# Patient Record
Sex: MALE | Race: WHITE | NOT HISPANIC OR LATINO | Employment: FULL TIME | URBAN - METROPOLITAN AREA
[De-identification: names, ages, dates, MRNs, and addresses within clinical notes are randomized per-mention and may not be internally consistent; named-entity substitution may affect disease eponyms.]

---

## 2020-07-23 ENCOUNTER — TELEPHONE (OUTPATIENT)
Dept: INTERNAL MEDICINE | Facility: CLINIC | Age: 30
End: 2020-07-23

## 2020-07-23 ENCOUNTER — OFFICE VISIT (OUTPATIENT)
Dept: INTERNAL MEDICINE | Facility: CLINIC | Age: 30
End: 2020-07-23
Payer: COMMERCIAL

## 2020-07-23 VITALS
TEMPERATURE: 98 F | OXYGEN SATURATION: 97 % | DIASTOLIC BLOOD PRESSURE: 77 MMHG | HEIGHT: 71 IN | HEART RATE: 81 BPM | BODY MASS INDEX: 26.55 KG/M2 | SYSTOLIC BLOOD PRESSURE: 125 MMHG | WEIGHT: 189.63 LBS

## 2020-07-23 DIAGNOSIS — R19.7 DIARRHEA: ICD-10-CM

## 2020-07-23 DIAGNOSIS — Z20.822 SUSPECTED COVID-19 VIRUS INFECTION: ICD-10-CM

## 2020-07-23 DIAGNOSIS — M79.10 MUSCLE PAIN: ICD-10-CM

## 2020-07-23 PROCEDURE — 99999 PR PBB SHADOW E&M-EST. PATIENT-LVL III: ICD-10-PCS | Mod: PBBFAC,,, | Performed by: INTERNAL MEDICINE

## 2020-07-23 PROCEDURE — U0003 INFECTIOUS AGENT DETECTION BY NUCLEIC ACID (DNA OR RNA); SEVERE ACUTE RESPIRATORY SYNDROME CORONAVIRUS 2 (SARS-COV-2) (CORONAVIRUS DISEASE [COVID-19]), AMPLIFIED PROBE TECHNIQUE, MAKING USE OF HIGH THROUGHPUT TECHNOLOGIES AS DESCRIBED BY CMS-2020-01-R: HCPCS

## 2020-07-23 PROCEDURE — 99213 OFFICE O/P EST LOW 20 MIN: CPT | Mod: S$GLB,,, | Performed by: INTERNAL MEDICINE

## 2020-07-23 PROCEDURE — 99999 PR PBB SHADOW E&M-EST. PATIENT-LVL III: CPT | Mod: PBBFAC,,, | Performed by: INTERNAL MEDICINE

## 2020-07-23 PROCEDURE — 99213 PR OFFICE/OUTPT VISIT, EST, LEVL III, 20-29 MIN: ICD-10-PCS | Mod: S$GLB,,, | Performed by: INTERNAL MEDICINE

## 2020-07-23 NOTE — TELEPHONE ENCOUNTER
Tried contacting patient and LVM about changing his visit to virtual. Will send pt a [place]etch message.

## 2020-07-23 NOTE — PROGRESS NOTES
Subjective:       Patient ID: Angelito Mills is a 29 y.o. male.    Chief Complaint: Fatigue, Nausea, Nasal Congestion, Headache, Diarrhea, and Abdominal Pain    29 year old man complains of 2 days of body aches, congestion, mild nausea and diarrhea.  No recognized fever..  Girlfriend's co-worker is positive for Covid.  He had rapid covid test 2 days ago that was negative.  At that point he had been symptomatic for less than 24 hours  Today no diarrhea but feels wiped out    Review of Systems   Constitutional: Negative for activity change, appetite change and fever.   HENT: Negative for congestion, postnasal drip and sore throat.    Respiratory: Negative for cough, shortness of breath and wheezing.    Cardiovascular: Negative for chest pain and palpitations.   Gastrointestinal: Negative for abdominal pain, blood in stool, constipation, diarrhea, nausea and vomiting.   Genitourinary: Negative for decreased urine volume, difficulty urinating, flank pain and frequency.   Musculoskeletal: Negative for arthralgias.   Neurological: Negative for dizziness, weakness and headaches.       Objective:      Physical Exam  Constitutional:       General: He is not in acute distress.     Appearance: He is well-developed.   HENT:      Head: Normocephalic and atraumatic.      Right Ear: External ear normal.      Left Ear: External ear normal.   Eyes:      Conjunctiva/sclera: Conjunctivae normal.      Pupils: Pupils are equal, round, and reactive to light.   Neck:      Musculoskeletal: Normal range of motion and neck supple.      Thyroid: No thyromegaly.   Cardiovascular:      Rate and Rhythm: Normal rate and regular rhythm.   Pulmonary:      Effort: Pulmonary effort is normal.      Breath sounds: Normal breath sounds.   Abdominal:      General: Bowel sounds are normal.      Palpations: Abdomen is soft. There is no mass.      Tenderness: There is no abdominal tenderness. There is no guarding or rebound.   Musculoskeletal: Normal range of  motion.   Lymphadenopathy:      Cervical: No cervical adenopathy.   Skin:     General: Skin is warm and dry.   Neurological:      Mental Status: He is alert and oriented to person, place, and time.      Cranial Nerves: No cranial nerve deficit.      Motor: No abnormal muscle tone.      Coordination: Coordination normal.      Deep Tendon Reflexes: Reflexes are normal and symmetric. Reflexes normal.         Assessment:       1. Diarrhea    2. Muscle pain    3. Suspected Covid-19 Virus Infection        Plan:   Angelito was seen today for fatigue, nausea, nasal congestion, headache, diarrhea and abdominal pain.    Diagnoses and all orders for this visit:    Diarrhea  -     COVID-19 Routine Screening    Muscle pain  -     COVID-19 Routine Screening    Suspected Covid-19 Virus Infection  -     COVID-19 Routine Screening

## 2020-07-24 LAB — SARS-COV-2 RNA RESP QL NAA+PROBE: NOT DETECTED

## 2020-11-26 RX ORDER — ONDANSETRON 4 MG/1
4 TABLET, ORALLY DISINTEGRATING ORAL EVERY 8 HOURS PRN
Qty: 30 TABLET | Refills: 1 | Status: CANCELLED | OUTPATIENT
Start: 2020-11-26

## 2021-01-12 ENCOUNTER — LAB VISIT (OUTPATIENT)
Dept: LAB | Facility: HOSPITAL | Age: 31
End: 2021-01-12
Attending: ALLERGY & IMMUNOLOGY
Payer: COMMERCIAL

## 2021-01-12 ENCOUNTER — OFFICE VISIT (OUTPATIENT)
Dept: ALLERGY | Facility: CLINIC | Age: 31
End: 2021-01-12
Payer: COMMERCIAL

## 2021-01-12 VITALS — WEIGHT: 201.94 LBS | TEMPERATURE: 97 F | BODY MASS INDEX: 28.27 KG/M2 | HEIGHT: 71 IN

## 2021-01-12 DIAGNOSIS — H10.423 SIMPLE CHRONIC CONJUNCTIVITIS OF BOTH EYES: ICD-10-CM

## 2021-01-12 DIAGNOSIS — J31.0 CHRONIC RHINITIS: ICD-10-CM

## 2021-01-12 DIAGNOSIS — J31.0 CHRONIC RHINITIS: Primary | ICD-10-CM

## 2021-01-12 PROCEDURE — 86003 ALLG SPEC IGE CRUDE XTRC EA: CPT | Mod: 59

## 2021-01-12 PROCEDURE — 99204 PR OFFICE/OUTPT VISIT, NEW, LEVL IV, 45-59 MIN: ICD-10-PCS | Mod: S$GLB,,, | Performed by: ALLERGY & IMMUNOLOGY

## 2021-01-12 PROCEDURE — 86003 ALLG SPEC IGE CRUDE XTRC EA: CPT

## 2021-01-12 PROCEDURE — 36415 COLL VENOUS BLD VENIPUNCTURE: CPT | Mod: PO

## 2021-01-12 PROCEDURE — 99999 PR PBB SHADOW E&M-EST. PATIENT-LVL III: CPT | Mod: PBBFAC,,, | Performed by: ALLERGY & IMMUNOLOGY

## 2021-01-12 PROCEDURE — 99999 PR PBB SHADOW E&M-EST. PATIENT-LVL III: ICD-10-PCS | Mod: PBBFAC,,, | Performed by: ALLERGY & IMMUNOLOGY

## 2021-01-12 PROCEDURE — 99204 OFFICE O/P NEW MOD 45 MIN: CPT | Mod: S$GLB,,, | Performed by: ALLERGY & IMMUNOLOGY

## 2021-01-12 RX ORDER — MONTELUKAST SODIUM 10 MG/1
10 TABLET ORAL DAILY
Qty: 30 TABLET | Refills: 12 | Status: SHIPPED | OUTPATIENT
Start: 2021-01-12 | End: 2022-02-10

## 2021-01-12 RX ORDER — OLOPATADINE HYDROCHLORIDE 1 MG/ML
1 SOLUTION/ DROPS OPHTHALMIC 2 TIMES DAILY
Qty: 10 ML | Refills: 12 | Status: SHIPPED | OUTPATIENT
Start: 2021-01-12 | End: 2023-08-31

## 2021-01-12 RX ORDER — MONTELUKAST SODIUM 10 MG/1
TABLET ORAL
COMMUNITY
End: 2021-01-12 | Stop reason: SDUPTHER

## 2021-01-12 RX ORDER — PSEUDOEPHEDRINE HCL 120 MG/1
TABLET, FILM COATED, EXTENDED RELEASE ORAL
COMMUNITY
End: 2023-08-31

## 2021-01-12 RX ORDER — DIPHENHYDRAMINE HCL 25 MG
25 CAPSULE ORAL EVERY 6 HOURS PRN
COMMUNITY

## 2021-01-12 RX ORDER — CETIRIZINE HYDROCHLORIDE 10 MG/1
1 TABLET ORAL DAILY
COMMUNITY

## 2021-01-12 RX ORDER — KETOTIFEN FUMARATE 0.35 MG/ML
1 SOLUTION/ DROPS OPHTHALMIC 2 TIMES DAILY
COMMUNITY

## 2021-01-12 RX ORDER — FLUTICASONE PROPIONATE 50 MCG
2 SPRAY, SUSPENSION (ML) NASAL DAILY
COMMUNITY
End: 2021-01-12 | Stop reason: SDUPTHER

## 2021-01-12 RX ORDER — FLUTICASONE PROPIONATE 50 MCG
2 SPRAY, SUSPENSION (ML) NASAL DAILY
Qty: 16 G | Refills: 12 | Status: ON HOLD | OUTPATIENT
Start: 2021-01-12 | End: 2023-06-30 | Stop reason: HOSPADM

## 2021-01-15 LAB
A ALTERNATA IGE QN: <0.1 KU/L
A FUMIGATUS IGE QN: <0.1 KU/L
ALLERGEN CHAETOMIUM GLOBOSUM IGE: <0.1 KU/L
ALLERGEN WALNUT TREE IGE: 0.1 KU/L
ALLERGEN WHITE PINE TREE IGE: <0.1 KU/L
BAHIA GRASS IGE QN: 0.11 KU/L
BALD CYPRESS IGE QN: <0.1 KU/L
BERMUDA GRASS IGE QN: <0.1 KU/L
C HERBARUM IGE QN: <0.1 KU/L
C LUNATA IGE QN: <0.1 KU/L
CAT DANDER IGE QN: 0.73 KU/L
CHAETOMIUM GLOB. CLASS: NORMAL
COMMON RAGWEED IGE QN: <0.1 KU/L
COTTONWOOD IGE QN: <0.1 KU/L
D FARINAE IGE QN: 24.8 KU/L
D PTERONYSS IGE QN: 21.7 KU/L
DEPRECATED A ALTERNATA IGE RAST QL: NORMAL
DEPRECATED A FUMIGATUS IGE RAST QL: NORMAL
DEPRECATED BAHIA GRASS IGE RAST QL: ABNORMAL
DEPRECATED BALD CYPRESS IGE RAST QL: NORMAL
DEPRECATED BERMUDA GRASS IGE RAST QL: NORMAL
DEPRECATED C HERBARUM IGE RAST QL: NORMAL
DEPRECATED C LUNATA IGE RAST QL: NORMAL
DEPRECATED CAT DANDER IGE RAST QL: ABNORMAL
DEPRECATED COMMON RAGWEED IGE RAST QL: NORMAL
DEPRECATED COTTONWOOD IGE RAST QL: NORMAL
DEPRECATED D FARINAE IGE RAST QL: ABNORMAL
DEPRECATED D PTERONYSS IGE RAST QL: ABNORMAL
DEPRECATED DOG DANDER IGE RAST QL: ABNORMAL
DEPRECATED ELDER IGE RAST QL: ABNORMAL
DEPRECATED ENGL PLANTAIN IGE RAST QL: ABNORMAL
DEPRECATED HORSE DANDER IGE RAST QL: NORMAL
DEPRECATED JOHNSON GRASS IGE RAST QL: ABNORMAL
DEPRECATED LONDON PLANE IGE RAST QL: ABNORMAL
DEPRECATED MUGWORT IGE RAST QL: NORMAL
DEPRECATED P NOTATUM IGE RAST QL: NORMAL
DEPRECATED PECAN/HICK TREE IGE RAST QL: NORMAL
DEPRECATED ROACH IGE RAST QL: ABNORMAL
DEPRECATED S ROSTRATA IGE RAST QL: NORMAL
DEPRECATED SALTWORT IGE RAST QL: NORMAL
DEPRECATED SILVER BIRCH IGE RAST QL: ABNORMAL
DEPRECATED TIMOTHY IGE RAST QL: ABNORMAL
DEPRECATED WEST RAGWEED IGE RAST QL: ABNORMAL
DEPRECATED WHITE OAK IGE RAST QL: ABNORMAL
DEPRECATED WILLOW IGE RAST QL: NORMAL
DOG DANDER IGE QN: 1.71 KU/L
ELDER IGE QN: 0.12 KU/L
ENGL PLANTAIN IGE QN: 0.13 KU/L
HORSE DANDER IGE QN: <0.1 KU/L
JOHNSON GRASS IGE QN: 0.25 KU/L
LONDON PLANE IGE QN: 0.11 KU/L
MUGWORT IGE QN: <0.1 KU/L
P NOTATUM IGE QN: <0.1 KU/L
PECAN/HICK TREE IGE QN: <0.1 KU/L
ROACH IGE QN: 0.71 KU/L
S ROSTRATA IGE QN: <0.1 KU/L
SALTWORT IGE QN: <0.1 KU/L
SILVER BIRCH IGE QN: 2.33 KU/L
TIMOTHY IGE QN: 4.47 KU/L
WALNUT TREE CLASS: NORMAL
WEST RAGWEED IGE QN: 0.16 KU/L
WHITE OAK IGE QN: 2.67 KU/L
WHITE PINE CLASS: NORMAL
WILLOW IGE QN: <0.1 KU/L

## 2021-01-19 ENCOUNTER — PATIENT MESSAGE (OUTPATIENT)
Dept: ALLERGY | Facility: CLINIC | Age: 31
End: 2021-01-19

## 2021-02-11 ENCOUNTER — OFFICE VISIT (OUTPATIENT)
Dept: PRIMARY CARE CLINIC | Facility: CLINIC | Age: 31
End: 2021-02-11
Payer: COMMERCIAL

## 2021-02-11 VITALS
TEMPERATURE: 98 F | OXYGEN SATURATION: 97 % | HEART RATE: 74 BPM | DIASTOLIC BLOOD PRESSURE: 78 MMHG | HEIGHT: 71 IN | WEIGHT: 199.94 LBS | BODY MASS INDEX: 27.99 KG/M2 | SYSTOLIC BLOOD PRESSURE: 110 MMHG

## 2021-02-11 DIAGNOSIS — Z76.89 ENCOUNTER TO ESTABLISH CARE: ICD-10-CM

## 2021-02-11 DIAGNOSIS — Z53.20 HIV SCREENING DECLINED: ICD-10-CM

## 2021-02-11 DIAGNOSIS — Z00.00 LABORATORY EXAM ORDERED AS PART OF ROUTINE GENERAL MEDICAL EXAMINATION: ICD-10-CM

## 2021-02-11 DIAGNOSIS — J30.89 ENVIRONMENTAL AND SEASONAL ALLERGIES: ICD-10-CM

## 2021-02-11 DIAGNOSIS — Z00.00 ANNUAL PHYSICAL EXAM: Primary | ICD-10-CM

## 2021-02-11 PROCEDURE — 99999 PR PBB SHADOW E&M-EST. PATIENT-LVL IV: ICD-10-PCS | Mod: PBBFAC,,, | Performed by: FAMILY MEDICINE

## 2021-02-11 PROCEDURE — 99999 PR PBB SHADOW E&M-EST. PATIENT-LVL IV: CPT | Mod: PBBFAC,,, | Performed by: FAMILY MEDICINE

## 2021-02-11 PROCEDURE — 99395 PREV VISIT EST AGE 18-39: CPT | Mod: S$GLB,,, | Performed by: FAMILY MEDICINE

## 2021-02-11 PROCEDURE — 99395 PR PREVENTIVE VISIT,EST,18-39: ICD-10-PCS | Mod: S$GLB,,, | Performed by: FAMILY MEDICINE

## 2021-02-11 RX ORDER — ONDANSETRON 4 MG/1
TABLET, ORALLY DISINTEGRATING ORAL
COMMUNITY
Start: 2020-12-16 | End: 2022-03-29

## 2021-02-18 ENCOUNTER — TELEPHONE (OUTPATIENT)
Dept: PRIMARY CARE CLINIC | Facility: CLINIC | Age: 31
End: 2021-02-18

## 2021-02-18 ENCOUNTER — LAB VISIT (OUTPATIENT)
Dept: LAB | Facility: HOSPITAL | Age: 31
End: 2021-02-18
Attending: FAMILY MEDICINE
Payer: COMMERCIAL

## 2021-02-18 DIAGNOSIS — Z00.00 LABORATORY EXAM ORDERED AS PART OF ROUTINE GENERAL MEDICAL EXAMINATION: ICD-10-CM

## 2021-02-18 LAB
ALBUMIN SERPL BCP-MCNC: 4.3 G/DL (ref 3.5–5.2)
ALP SERPL-CCNC: 57 U/L (ref 55–135)
ALT SERPL W/O P-5'-P-CCNC: 19 U/L (ref 10–44)
ANION GAP SERPL CALC-SCNC: 8 MMOL/L (ref 8–16)
AST SERPL-CCNC: 24 U/L (ref 10–40)
BASOPHILS # BLD AUTO: 0.05 K/UL (ref 0–0.2)
BASOPHILS NFR BLD: 0.7 % (ref 0–1.9)
BILIRUB SERPL-MCNC: 0.7 MG/DL (ref 0.1–1)
BUN SERPL-MCNC: 20 MG/DL (ref 6–20)
CALCIUM SERPL-MCNC: 9 MG/DL (ref 8.7–10.5)
CHLORIDE SERPL-SCNC: 102 MMOL/L (ref 95–110)
CHOLEST SERPL-MCNC: 174 MG/DL (ref 120–199)
CHOLEST/HDLC SERPL: 3.3 {RATIO} (ref 2–5)
CO2 SERPL-SCNC: 28 MMOL/L (ref 23–29)
CREAT SERPL-MCNC: 1.2 MG/DL (ref 0.5–1.4)
DIFFERENTIAL METHOD: ABNORMAL
EOSINOPHIL # BLD AUTO: 0.2 K/UL (ref 0–0.5)
EOSINOPHIL NFR BLD: 2.7 % (ref 0–8)
ERYTHROCYTE [DISTWIDTH] IN BLOOD BY AUTOMATED COUNT: 11.9 % (ref 11.5–14.5)
EST. GFR  (AFRICAN AMERICAN): >60 ML/MIN/1.73 M^2
EST. GFR  (NON AFRICAN AMERICAN): >60 ML/MIN/1.73 M^2
ESTIMATED AVG GLUCOSE: 103 MG/DL (ref 68–131)
GLUCOSE SERPL-MCNC: 95 MG/DL (ref 70–110)
HBA1C MFR BLD: 5.2 % (ref 4–5.6)
HCT VFR BLD AUTO: 46.5 % (ref 40–54)
HCV AB SERPL QL IA: NEGATIVE
HDLC SERPL-MCNC: 52 MG/DL (ref 40–75)
HDLC SERPL: 29.9 % (ref 20–50)
HGB BLD-MCNC: 14.8 G/DL (ref 14–18)
IMM GRANULOCYTES # BLD AUTO: 0.04 K/UL (ref 0–0.04)
IMM GRANULOCYTES NFR BLD AUTO: 0.6 % (ref 0–0.5)
LDLC SERPL CALC-MCNC: 102.2 MG/DL (ref 63–159)
LYMPHOCYTES # BLD AUTO: 2.5 K/UL (ref 1–4.8)
LYMPHOCYTES NFR BLD: 36.5 % (ref 18–48)
MCH RBC QN AUTO: 30.6 PG (ref 27–31)
MCHC RBC AUTO-ENTMCNC: 31.8 G/DL (ref 32–36)
MCV RBC AUTO: 96 FL (ref 82–98)
MONOCYTES # BLD AUTO: 0.6 K/UL (ref 0.3–1)
MONOCYTES NFR BLD: 9.1 % (ref 4–15)
NEUTROPHILS # BLD AUTO: 3.5 K/UL (ref 1.8–7.7)
NEUTROPHILS NFR BLD: 50.4 % (ref 38–73)
NONHDLC SERPL-MCNC: 122 MG/DL
NRBC BLD-RTO: 0 /100 WBC
PLATELET # BLD AUTO: 296 K/UL (ref 150–350)
PMV BLD AUTO: 9.8 FL (ref 9.2–12.9)
POTASSIUM SERPL-SCNC: 4.2 MMOL/L (ref 3.5–5.1)
PROT SERPL-MCNC: 7.4 G/DL (ref 6–8.4)
RBC # BLD AUTO: 4.83 M/UL (ref 4.6–6.2)
SODIUM SERPL-SCNC: 138 MMOL/L (ref 136–145)
TRIGL SERPL-MCNC: 99 MG/DL (ref 30–150)
TSH SERPL DL<=0.005 MIU/L-ACNC: 1.42 UIU/ML (ref 0.4–4)
WBC # BLD AUTO: 6.95 K/UL (ref 3.9–12.7)

## 2021-02-18 PROCEDURE — 85025 COMPLETE CBC W/AUTO DIFF WBC: CPT

## 2021-02-18 PROCEDURE — 84443 ASSAY THYROID STIM HORMONE: CPT

## 2021-02-18 PROCEDURE — 80061 LIPID PANEL: CPT

## 2021-02-18 PROCEDURE — 80053 COMPREHEN METABOLIC PANEL: CPT

## 2021-02-18 PROCEDURE — 36415 COLL VENOUS BLD VENIPUNCTURE: CPT | Mod: PN

## 2021-02-18 PROCEDURE — 86803 HEPATITIS C AB TEST: CPT

## 2021-02-18 PROCEDURE — 83036 HEMOGLOBIN GLYCOSYLATED A1C: CPT

## 2021-04-16 ENCOUNTER — PATIENT MESSAGE (OUTPATIENT)
Dept: RESEARCH | Facility: HOSPITAL | Age: 31
End: 2021-04-16

## 2021-07-14 ENCOUNTER — OFFICE VISIT (OUTPATIENT)
Dept: URGENT CARE | Facility: CLINIC | Age: 31
End: 2021-07-14
Payer: COMMERCIAL

## 2021-07-14 VITALS
WEIGHT: 190 LBS | DIASTOLIC BLOOD PRESSURE: 74 MMHG | RESPIRATION RATE: 16 BRPM | HEART RATE: 72 BPM | BODY MASS INDEX: 26.6 KG/M2 | TEMPERATURE: 97 F | OXYGEN SATURATION: 100 % | SYSTOLIC BLOOD PRESSURE: 120 MMHG | HEIGHT: 71 IN

## 2021-07-14 DIAGNOSIS — J03.90 EXUDATIVE TONSILLITIS: ICD-10-CM

## 2021-07-14 DIAGNOSIS — J02.9 SORE THROAT: Primary | ICD-10-CM

## 2021-07-14 LAB
CTP QC/QA: YES
CTP QC/QA: YES
MOLECULAR STREP A: NEGATIVE
SARS-COV-2 RDRP RESP QL NAA+PROBE: NEGATIVE

## 2021-07-14 PROCEDURE — 87651 STREP A DNA AMP PROBE: CPT | Mod: QW,S$GLB,, | Performed by: INTERNAL MEDICINE

## 2021-07-14 PROCEDURE — 99214 PR OFFICE/OUTPT VISIT, EST, LEVL IV, 30-39 MIN: ICD-10-PCS | Mod: 25,S$GLB,, | Performed by: INTERNAL MEDICINE

## 2021-07-14 PROCEDURE — 96372 PR INJECTION,THERAP/PROPH/DIAG2ST, IM OR SUBCUT: ICD-10-PCS | Mod: S$GLB,,, | Performed by: INTERNAL MEDICINE

## 2021-07-14 PROCEDURE — U0002 COVID-19 LAB TEST NON-CDC: HCPCS | Mod: QW,S$GLB,, | Performed by: INTERNAL MEDICINE

## 2021-07-14 PROCEDURE — U0002: ICD-10-PCS | Mod: QW,S$GLB,, | Performed by: INTERNAL MEDICINE

## 2021-07-14 PROCEDURE — 96372 THER/PROPH/DIAG INJ SC/IM: CPT | Mod: S$GLB,,, | Performed by: INTERNAL MEDICINE

## 2021-07-14 PROCEDURE — 99214 OFFICE O/P EST MOD 30 MIN: CPT | Mod: 25,S$GLB,, | Performed by: INTERNAL MEDICINE

## 2021-07-14 PROCEDURE — 87651 POCT STREP A MOLECULAR: ICD-10-PCS | Mod: QW,S$GLB,, | Performed by: INTERNAL MEDICINE

## 2021-07-14 RX ORDER — DEXAMETHASONE SODIUM PHOSPHATE 100 MG/10ML
10 INJECTION INTRAMUSCULAR; INTRAVENOUS
Status: COMPLETED | OUTPATIENT
Start: 2021-07-14 | End: 2021-07-14

## 2021-07-14 RX ORDER — AMOXICILLIN AND CLAVULANATE POTASSIUM 875; 125 MG/1; MG/1
1 TABLET, FILM COATED ORAL EVERY 12 HOURS
Qty: 20 TABLET | Refills: 0 | Status: SHIPPED | OUTPATIENT
Start: 2021-07-14 | End: 2021-07-24

## 2021-07-14 RX ADMIN — DEXAMETHASONE SODIUM PHOSPHATE 10 MG: 100 INJECTION INTRAMUSCULAR; INTRAVENOUS at 08:07

## 2022-01-03 ENCOUNTER — TELEPHONE (OUTPATIENT)
Dept: ORTHOPEDICS | Facility: CLINIC | Age: 32
End: 2022-01-03
Payer: COMMERCIAL

## 2022-01-03 NOTE — TELEPHONE ENCOUNTER
LVM for pt informing him that sooner appts are available for his possible tendon injury. Offered appts 01/04/22 or 01/06/22. Requested a call back to [facility] to assist with r/s, if interested/available.

## 2022-01-04 ENCOUNTER — TELEPHONE (OUTPATIENT)
Dept: ORTHOPEDICS | Facility: CLINIC | Age: 32
End: 2022-01-04
Payer: COMMERCIAL

## 2022-01-04 ENCOUNTER — OFFICE VISIT (OUTPATIENT)
Dept: ORTHOPEDICS | Facility: CLINIC | Age: 32
End: 2022-01-04
Payer: COMMERCIAL

## 2022-01-04 ENCOUNTER — HOSPITAL ENCOUNTER (OUTPATIENT)
Dept: RADIOLOGY | Facility: OTHER | Age: 32
Discharge: HOME | End: 2022-01-04
Attending: ORTHOPAEDIC SURGERY
Payer: COMMERCIAL

## 2022-01-04 VITALS
HEART RATE: 76 BPM | DIASTOLIC BLOOD PRESSURE: 82 MMHG | WEIGHT: 190 LBS | HEIGHT: 71 IN | SYSTOLIC BLOOD PRESSURE: 124 MMHG | BODY MASS INDEX: 26.6 KG/M2

## 2022-01-04 DIAGNOSIS — M20.032 SWAN-NECK DEFORMITY OF FINGER OF LEFT HAND: ICD-10-CM

## 2022-01-04 DIAGNOSIS — M79.642 LEFT HAND PAIN: Primary | ICD-10-CM

## 2022-01-04 DIAGNOSIS — M79.642 LEFT HAND PAIN: ICD-10-CM

## 2022-01-04 DIAGNOSIS — S69.92XA INJURY OF FINGER OF LEFT HAND, INITIAL ENCOUNTER: Primary | ICD-10-CM

## 2022-01-04 PROCEDURE — 73130 XR HAND COMPLETE 3 VIEW LEFT: ICD-10-PCS | Mod: 26,LT,, | Performed by: RADIOLOGY

## 2022-01-04 PROCEDURE — 99204 PR OFFICE/OUTPT VISIT, NEW, LEVL IV, 45-59 MIN: ICD-10-PCS | Mod: S$GLB,,, | Performed by: ORTHOPAEDIC SURGERY

## 2022-01-04 PROCEDURE — 73130 X-RAY EXAM OF HAND: CPT | Mod: TC,FY,LT

## 2022-01-04 PROCEDURE — 73130 X-RAY EXAM OF HAND: CPT | Mod: 26,LT,, | Performed by: RADIOLOGY

## 2022-01-04 PROCEDURE — 99999 PR PBB SHADOW E&M-EST. PATIENT-LVL III: CPT | Mod: PBBFAC,,, | Performed by: ORTHOPAEDIC SURGERY

## 2022-01-04 PROCEDURE — 99204 OFFICE O/P NEW MOD 45 MIN: CPT | Mod: S$GLB,,, | Performed by: ORTHOPAEDIC SURGERY

## 2022-01-04 PROCEDURE — 99999 PR PBB SHADOW E&M-EST. PATIENT-LVL III: ICD-10-PCS | Mod: PBBFAC,,, | Performed by: ORTHOPAEDIC SURGERY

## 2022-01-04 NOTE — TELEPHONE ENCOUNTER
Spoke c pt. Confirmed appt location & time change c Dr. Ugarte 01/04/22. Informed pt that XRs are needed prior to appt. Advised pt to arrive for XR appt 45  minutes prior to scheduled appt c Dr. Ugarte. Advised pt that XR is located on 1st floor of [facility] and the Hand Clinic is located on the 9th floor, [address].  Patient expressed understanding and was thankful.     ----- Message from Karly Pillai sent at 1/4/2022 10:50 AM CST -----  Regarding: missed call       Who Called: Angelito         Who Left Message for Patient: Justine          Does the patient know what this is regarding? Yes         Best Call Back Number:[phone]         Additional Information:

## 2022-01-04 NOTE — PROGRESS NOTES
Subjective:      Patient ID: Angelito Mills is a 31 y.o. male.    Chief Complaint: Pain of the Left Hand      HPI  Angelito Mills is a 31 y.o. male presenting today for evaluation of the left long finger. He reports an injury to the finger about one month ago he states he was reaching for a remote in the couch when he jammed his finger on the hard wooden part under the couch. He felt an immediate pop with associated pain. He reports persistent pain in the finger with decreased range of motion. He has been keeping the finger immobilized in a finger splint. He does remove periodically for gentle ROM but reports this causes pain. He denies numbness.       Review of patient's allergies indicates:  No Known Allergies      Current Outpatient Medications   Medication Sig Dispense Refill    cetirizine (ZYRTEC) 10 MG tablet Zyrtec 10 mg tablet   Take 1 tablet every day by oral route.      diphenhydrAMINE (BENADRYL) 25 mg capsule Take 25 mg by mouth every 6 (six) hours as needed for Itching.      fluticasone propionate (FLONASE) 50 mcg/actuation nasal spray 2 sprays (100 mcg total) by Each Nostril route once daily. 16 g 12    ketotifen (ZADITOR) 0.025 % (0.035 %) ophthalmic solution 1 drop 2 (two) times daily.      montelukast (SINGULAIR) 10 mg tablet Take 1 tablet (10 mg total) by mouth once daily. 30 tablet 12    MULTIVITAMIN ORAL multivitamin      olopatadine (PATANOL) 0.1 % ophthalmic solution Place 1 drop into both eyes 2 (two) times daily. 10 mL 12    pseudoephedrine (SUDAFED) 120 mg 12 hr tablet Sudafed 12 Hour      ondansetron (ZOFRAN-ODT) 4 MG TbDL DISSOLVE 1 TO 2 TABLETS IN MOUTH EVERY 8 HOURS AS NEEDED FOR NAUSEA       No current facility-administered medications for this visit.       Past Medical History:   Diagnosis Date    ADD (attention deficit disorder)     Allergy     Recurrent upper respiratory infection (URI)        History reviewed. No pertinent surgical history.    Review of  "Systems:  Constitutional: Negative for chills and fever.   Respiratory: Negative for cough and shortness of breath.    Gastrointestinal: Negative for nausea and vomiting.   Skin: Negative for rash.   Neurological: Negative for dizziness and headaches.   Psychiatric/Behavioral: Negative for depression.   MSK as in HPI       OBJECTIVE:     PHYSICAL EXAM:  /82   Pulse 76   Ht 5' 11" (1.803 m)   Wt 86.2 kg (190 lb)   BMI 26.50 kg/m²     GEN:  NAD, well-developed, well-groomed.  NEURO: Awake, alert, and oriented. Normal attention and concentration.    PSYCH: Normal mood and affect. Behavior is normal.  HEENT: No cervical lymphadenopathy noted.  CARDIOVASCULAR: Radial pulses 2+ bilaterally. No LE edema noted.  PULMONARY: Breath sounds normal. No respiratory distress.  SKIN: Intact, no rashes.      MSK:   LUE:  Good active ROM of the wrist and fingers. He has edema over the posterior DIP of the finger with minimal tenderness to palpation of the area. he has minimal extensor lag present. There is mild hyperextension at the PIP joint. He is able to flex the finger with some difficulty and associated pain. AIN/PIN/Radial/Median/Ulnar Nerves assessed in isolation without deficit. Radial & Ulnar arteries palpated 2+. Capillary Refill <3s.      RADIOGRAPHS:  Xray left hand 1/4/22   FINDINGS:  No acute fracture or dislocation seen.  No significant soft tissue edema or radiopaque retained foreign body.   Impression:   No acute osseous abnormality seen.    Comments: I have personally reviewed the imaging and I agree with the above radiologist's report.    ASSESSMENT/PLAN:       ICD-10-CM ICD-9-CM   1. Injury of finger of left hand, initial encounter  S69.92XA 959.5   2. [place]-neck deformity of finger of left hand  M20.032 736.22       Orders Placed This Encounter    Ambulatory referral/consult to Physical/Occupational Therapy     Plan:   OT ordered, can fabricate custom orthosis with DIP extension and PIP flexion   RTC 6 " weeks with PA         The patient indicates understanding of these issues and agrees to the plan.    Tootie Denton PA-C  [facility] [place]

## 2022-01-05 NOTE — PROGRESS NOTES
I have personally taken the history and examined the patient. I agree with the Hand Surgery PA's note. The plan will be :           Orders Placed This Encounter    Ambulatory referral/consult to Physical/Occupational Therapy      Plan:   OT ordered, can fabricate custom orthosis with DIP extension and PIP flexion   RTC 6 weeks with PA

## 2022-01-07 ENCOUNTER — CLINICAL SUPPORT (OUTPATIENT)
Dept: REHABILITATION | Facility: HOSPITAL | Age: 32
End: 2022-01-07
Payer: COMMERCIAL

## 2022-01-07 DIAGNOSIS — S69.92XA INJURY OF FINGER OF LEFT HAND, INITIAL ENCOUNTER: ICD-10-CM

## 2022-01-07 DIAGNOSIS — M25.642 FINGER STIFFNESS, LEFT: ICD-10-CM

## 2022-01-07 DIAGNOSIS — M20.032 SWAN-NECK DEFORMITY OF FINGER OF LEFT HAND: ICD-10-CM

## 2022-01-07 PROCEDURE — L3933 FO W/O JOINTS CF: HCPCS

## 2022-01-14 PROBLEM — M25.642 FINGER STIFFNESS, LEFT: Status: ACTIVE | Noted: 2022-01-14

## 2022-01-14 NOTE — PROGRESS NOTES
OT Orthosis Only    TIME RECORD    Date:  1/7/2022    Start Time:  2:00pm  Stop Time:  3:00pm    PROCEDURES:      UNTIMED  Procedure Min.    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    Injury of finger of left hand, initial encounter     [place]-neck deformity of finger of left hand     Finger stiffness, left        Subjective:     Pt. Reported comfort after completion of orthosis  Objective:     Patient seen by OT this session for fabrication of orthosis per MD orders. Fabricated and applied Anti-[place]-neck deformity Orthosis  Assessment:     Orthosis with good fit following fabrication. Pt. Able to carmen/doff independently.      Patient Education/Response:   Pt. Instructed to wear continuous, remove for bathing or hygiene. Patient/caregiver were provided written instructions on orthosis purpose, wear schedule, care and precautions to monitor for increased pain/edema, pressure points, skin breakdown or redness/skin irritation Patient/caregiver to contact clinic for adjustments as needed.  Patient signed copy of orthosis instructions, acknowledging delivery and understanding of wear, care, and precautions     (see Media for scanned documents)    Plans and Goals:     Goal of Orthosis to protect sx site/protect injury site. Pt. Instructed to return in 1 week for splint check.

## 2022-01-19 ENCOUNTER — CLINICAL SUPPORT (OUTPATIENT)
Dept: REHABILITATION | Facility: HOSPITAL | Age: 32
End: 2022-01-19
Payer: COMMERCIAL

## 2022-01-19 DIAGNOSIS — M25.642 FINGER STIFFNESS, LEFT: ICD-10-CM

## 2022-01-19 PROCEDURE — 97763 ORTHC/PROSTC MGMT SBSQ ENC: CPT

## 2022-01-20 NOTE — PROGRESS NOTES
Pt. Presented to clinic with LF in good position.  [place] posture forming.  Due to improvements in lateral band alignment, the decision was made to transition pt. to a Mallet Finger Splint.  Therapist fabricated a new Mallet Finger Orthosis.   Pt. Able to carmen and doff orthosis accurately.  Pt. Instructed to perform PIP e/f, avoiding PIP hyperextension. Pt. Educated in wearing an anti-[place] neck orthosis intermittently at night and for additional protection only.

## 2022-02-10 ENCOUNTER — PATIENT MESSAGE (OUTPATIENT)
Dept: ALLERGY | Facility: CLINIC | Age: 32
End: 2022-02-10
Payer: COMMERCIAL

## 2022-02-15 ENCOUNTER — OFFICE VISIT (OUTPATIENT)
Dept: ORTHOPEDICS | Facility: CLINIC | Age: 32
End: 2022-02-15
Payer: COMMERCIAL

## 2022-02-15 VITALS — WEIGHT: 190 LBS | BODY MASS INDEX: 26.6 KG/M2 | HEIGHT: 71 IN

## 2022-02-15 DIAGNOSIS — M20.032 SWAN-NECK DEFORMITY OF FINGER OF LEFT HAND: Primary | ICD-10-CM

## 2022-02-15 PROCEDURE — 99213 OFFICE O/P EST LOW 20 MIN: CPT | Mod: S$GLB,,, | Performed by: PHYSICIAN ASSISTANT

## 2022-02-15 PROCEDURE — 99999 PR PBB SHADOW E&M-EST. PATIENT-LVL III: CPT | Mod: PBBFAC,,, | Performed by: PHYSICIAN ASSISTANT

## 2022-02-15 PROCEDURE — 99999 PR PBB SHADOW E&M-EST. PATIENT-LVL III: ICD-10-PCS | Mod: PBBFAC,,, | Performed by: PHYSICIAN ASSISTANT

## 2022-02-15 PROCEDURE — 99213 PR OFFICE/OUTPT VISIT, EST, LEVL III, 20-29 MIN: ICD-10-PCS | Mod: S$GLB,,, | Performed by: PHYSICIAN ASSISTANT

## 2022-02-15 NOTE — PATIENT INSTRUCTIONS
Transition to mallet finger splint alone for the next week, then transition to shantell tape and nighttime splinting x2 weeks    Restart OT

## 2022-02-15 NOTE — PROGRESS NOTES
Subjective:      Patient ID: Angelito Mills is a 31 y.o. male.    Chief Complaint: Pain of the Left Hand      HPI  Angelito Mills is a  31 y.o. male presenting today for follow up of left long finger swan-neck deformity. Reports jamming his left long finger in the couch 2-3 months ago.  He was seen by Dr. Thurston, then evaluated by OT and placed in custom finger splint. He reports that he is back in the initial swan neck correcting splint due to the mallet finger splint extending too far and limiting his PIP motion. He reports his pain is improved.      Review of patient's allergies indicates:  No Known Allergies      Current Outpatient Medications   Medication Sig Dispense Refill    cetirizine (ZYRTEC) 10 MG tablet Zyrtec 10 mg tablet   Take 1 tablet every day by oral route.      diphenhydrAMINE (BENADRYL) 25 mg capsule Take 25 mg by mouth every 6 (six) hours as needed for Itching.      fluticasone propionate (FLONASE) 50 mcg/actuation nasal spray 2 sprays (100 mcg total) by Each Nostril route once daily. 16 g 12    ketotifen (ZADITOR) 0.025 % (0.035 %) ophthalmic solution 1 drop 2 (two) times daily.      montelukast (SINGULAIR) 10 mg tablet TAKE 1 TABLET(10 MG) BY MOUTH EVERY DAY 30 tablet 0    MULTIVITAMIN ORAL multivitamin      pseudoephedrine (SUDAFED) 120 mg 12 hr tablet Sudafed 12 Hour      olopatadine (PATANOL) 0.1 % ophthalmic solution Place 1 drop into both eyes 2 (two) times daily. 10 mL 12    ondansetron (ZOFRAN-ODT) 4 MG TbDL DISSOLVE 1 TO 2 TABLETS IN MOUTH EVERY 8 HOURS AS NEEDED FOR NAUSEA       No current facility-administered medications for this visit.       Past Medical History:   Diagnosis Date    ADD (attention deficit disorder)     Allergy     Recurrent upper respiratory infection (URI)        History reviewed. No pertinent surgical history.      Review of Systems:  ROS:  Constitutional: no fever or chills  Skin: no rash or suspicious lesions  Musculoskeletal: See HPI.  "  Neurological: no headaches, lightheadedness, or dizziness. No numbness or tingling  Psychological/behavioral: no anxiety or depression      OBJECTIVE:     PHYSICAL EXAM:  Height: 5' 11" (180.3 cm) Weight: 86.2 kg (190 lb)  Vitals:    02/15/22 1506   Weight: 86.2 kg (190 lb)   Height: 5' 11" (1.803 m)   PainSc:   4     Vitals reviewed.  Constitutional: NAD. Patient appears well-developed and well-nourished.   HENT:   Head: Normocephalic and atraumatic.   Neck: Normal range of motion.   Cardiovascular: Normal rate.    Pulmonary/Chest: Effort normal. No respiratory distress.   Neurological: Patient is awake, alert, oriented.   Psychiatric: Patient has a normal mood and affect. Behavior is normal. Judgment and thought content normal.  Musculoskeletal:  Custom orthotic in place.  Mild edema long finger.  No scars.  Left long PIP able to extend to neutral, no hyperextension noted.  Fair flexion of the PIP joint.  Left long de IP kept in neutral with support, wound splint was removed the PIP is able to stay in neutral extension.  [place] finger splint placed to support the PIP in extension.  Neurovascularly intact-good sensation motor function, good capillary refill, 2+ radial pulses.    RADIOGRAPHS:  Left Hand XRay, 1/4/2022  FINDINGS:  No acute fracture or dislocation seen.  No significant soft tissue edema or radiopaque retained foreign body.     Impression:  No acute osseous abnormality seen.    Comments: I have personally reviewed the imaging and I agree with the above radiologist's report.    ASSESSMENT/PLAN:   Angelito was seen today for pain.    Diagnoses and all orders for this visit:    [place]-neck deformity of finger of left hand  -     Ambulatory referral/consult to Physical/Occupational Therapy; Future        - discussed patient's resolving swan-neck deformity, discussed the mallet finger of the D IP joint that is improving/resolving.  Discussed restarting OT  - new OT order placed  - transition to mallet finger " splint alone for the next week, then transition to shantell tape and nighttime splinting x2 weeks  - call with questions or concerns  - follow-up in 6 weeks if not improved    Disclaimer: This note has been generated using voice-recognition software. There may be typographical errors that have been missed during proof-reading.

## 2022-02-24 ENCOUNTER — CLINICAL SUPPORT (OUTPATIENT)
Dept: REHABILITATION | Facility: HOSPITAL | Age: 32
End: 2022-02-24
Payer: COMMERCIAL

## 2022-02-24 DIAGNOSIS — M20.032 SWAN-NECK DEFORMITY OF FINGER OF LEFT HAND: ICD-10-CM

## 2022-02-24 DIAGNOSIS — M25.60 STIFFNESS IN JOINT: ICD-10-CM

## 2022-02-24 DIAGNOSIS — R53.1 WEAKNESS: ICD-10-CM

## 2022-02-24 PROCEDURE — 97110 THERAPEUTIC EXERCISES: CPT | Mod: PO

## 2022-02-24 PROCEDURE — 97165 OT EVAL LOW COMPLEX 30 MIN: CPT | Mod: PO

## 2022-02-24 NOTE — PATIENT INSTRUCTIONS
"[facility], OCCUPATIONAL THERAPY  HOME EXERCISE PROGRAM     Complete the following two massages for 3 minutes, 3x/day:                                  Complete the following exercises for 10 repetitions, 4x/day:       AROM: Isolated IPJ Flexion / Extension ("Hook")   Bend only your middle and end knuckles. Hold 3 seconds.   Straighten your fingers.       AROM: MCP and PIP Flexion / Extension ("Straight Fist")  Bend your bottom and middle knuckles, keeping the tips of your fingers straight.   Try to touch the pads of your fingers on your palm. Hold 3 seconds.   Straighten your fingers.       AROM: Composite Flexion / Extension ("Full Fist")  Bend every joint in your hand into a fist. Hold 3 seconds.   Straighten your fingers.     AROM: PIP (Middle Joint) Flexion / Extension  Pinch bottom knuckle  to prevent bending.   Actively bend middle knuckle until stretch is felt.   Hold 3 seconds. Relax. Straighten finger as far as possible.    AROM: DIP (Tip Joint) Flexion / Extension  Pinch middle knuckle to prevent bending.   Bend end knuckle until stretch is felt. Hold   3 seconds. Relax. Straighten finger as far as possible.      AROM: Abduction / Adduction  With hand flat on table, spread all fingers apart,   then bring them together as close as possible.  possible then straighten.     Therapist: Maggie Boasberg, OTR/ALFREDT        "

## 2022-02-24 NOTE — PLAN OF CARE
[facility]  Occupational Therapy Initial Evaluation    Date: 2/24/2022  Name: Angelito Mills  Clinic Number: [id number]    Therapy Diagnosis:   Encounter Diagnoses   Name Primary?   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     Stiffness in joint     Weakness      Physician: Adele Alarcon PA    Physician Orders: Start ROM and strengthening  Eval and Treat, modalities as needed  8+ visits  Medical Diagnosis: M20.032 (ICD-10-CM) - Pass Christian-neck deformity of finger of left hand  Surgical Procedure and Date: n/a / Date of Injury/Onset: 12/2/21  Evaluation Date: 2/24/22  Insurance Authorization Period Expiration: 12/31/22  Plan of Care Expiration: 4/22/22  Progress Note Due: 4/22/22   Date of Return to MD: not scheduled  Visit # / Visits authorized: 1 / 1  FOTO: initial eval     Precautions:  Standard    Time In: 1:30 pm  Time Out: 2:15 pm  Total Appointment Time (timed & untimed codes): 45 minutes      SUBJECTIVE     Date of Onset: 12/2/21    History of Current Condition/Mechanism of Injury: Angelito reports: he hit L MF on sofa and had a mallet finger deformity that turned into swan neck. He was splinted and returns today for evaluation.    Falls: none    Involved Side: L  Dominant Side: Right  Imaging: none   Prior Therapy: none  Occupation:    Working presently: employed  Duties: desk work and field work    Functional Limitations/Social History:    Previous functional status includes: Independent with all ADLs.     Current Functional Status   Home/Living environment: lives with their spouse      Limitation of Functional Status as follows:   ADLs/IADLs:     - Feeding: ind    - Bathing: ind    - Dressing/Grooming: ind    - Driving: ind     Leisure: Fishing, Hunting and working out    Pain:  Functional Pain Scale Rating 0-10: Current 0/10, worst 5/10, best 0/10   Location: L MF PIP joint and P2  Description: Aching and Dull  Aggravating Factors: Bending and  Lifting  Easing Factors: rest    Patient's Goals for Therapy: to use hand normally    Medical History:   Past Medical History:   Diagnosis Date    ADD (attention deficit disorder)     Allergy     Recurrent upper respiratory infection (URI)        Surgical History:    has no past surgical history on file.    Medications:   has a current medication list which includes the following prescription(s): cetirizine, diphenhydramine, fluticasone propionate, ketotifen, montelukast, multivitamin, olopatadine, ondansetron, and pseudoephedrine.    Allergies:   Review of patient's allergies indicates:  No Known Allergies       OBJECTIVE     Observation/Appearance: mild edema noted; pt presented wearing mallet splint    Edema. Measured in centimeters.   2/24/2022 2/24/2022    Left Right   Long:       P1 6.9 6.5    PIP 6.9 6.7   P2 5.9 5.9    DIP 5.5 5.5   P3 5.2 5.3       Hand ROM. Measured in degrees.   2/24/2022 2/24/2022    Left Right        Long:  MP 80 80              PIP 90 105              DIP 30 70              ESPINAL             Strength (Dynamometer) and Pinch Strength (Pinch Gauge)  Measured in pounds.   2/24/2022 2/24/2022    Left Right   Rung II 85 125   Key Pinch 20 22   3pt Pinch 19 24     Sensation: no numbness or tingling reported    Treatment   Total Treatment time (time-based codes) separate from Evaluation: 30 minutes    Angelito received the treatments listed below:     Supervised modalities after being cleared for contradictions: Paraffin bath -     Manual therapy techniques: Soft tissue Mobilization were applied to the: L MF for 10 minutes, including:  IASTM    Therapeutic exercises to develop ROM for 10 minutes, including:  TGEs and blocking exercises    Patient Education and Home Exercises      Education provided:   - precautions and progression of treatment    Written Home Exercises Provided: Patient instructed to cont prior HEP.  Exercises were reviewed and Angelito was able to demonstrate them prior to the  end of the session.  Angelito demonstrated good  understanding of the education provided. See EMR under Patient Instructions for exercises provided during therapy sessions.     Pt was advised to perform these exercises free of pain, and to stop performing them if pain occurs.    Patient/Family Education: role of OT, goals for OT, scheduling/cancellations - pt verbalized understanding. Discussed insurance limitations with patient.    ASSESSMENT     Angelito Mills is a 31 y.o. male referred to outpatient occupational therapy and presents with a medical diagnosis of resolving L MF swan neck deformity.  Patient presents with the following therapy deficits: Decreased ROM, Decreased  strength, Decreased pinch strength, Decreased functional hand use, Increased pain, Edema and Joint Stiffness and demonstrates limitations as described in the chart below. Following medical record review it is determined that pt will benefit from occupational therapy services in order to maximize pain free and/or functional use of left hand. The following goals were discussed with the patient and patient is in agreement with them as to be addressed in the treatment plan. The patient's rehab potential is Excellent.     Anticipated barriers to occupational therapy: none  Pt has no cultural, educational or language barriers to learning provided.    Profile and History Assessment of Occupational Performance Level of Clinical Decision Making Complexity Score   Occupational Profile:   Angelito Mills is a 31 y.o. male who lives with their spouse and is currently employed Angelito Mills has difficulty with  ADLs and IADLs as listed previously, which  Affecting hisdaily functional abilities.      Comorbidities:    has a past medical history of ADD (attention deficit disorder), Allergy, and Recurrent upper respiratory infection (URI).    Medical and Therapy History Review:   Brief               Performance Deficits    Physical:  Joint  Mobility  Muscle Power/Strength  Edema   Strength  Pain    Cognitive:  No Deficits    Psychosocial:    No Deficits     Clinical Decision Making:  low    Assessment Process:  Problem-Focused Assessments    Modification/Need for Assistance:  Not Necessary    Intervention Selection:  Multiple Treatment Options       low  Based on PMHX, co morbidities , data from assessments and functional level of assistance required with task and clinical presentation directly impacting function.         Goals:   The following goals were discussed with the patient and patient is in agreement with them as to be addressed in the treatment plan.   Long Term Goals (LTGs); to be met by discharge.  LTG #1: Pt will report no pain with gripping and lifting tasks.  LTG #2: Pt will return to prior level of function for ADLs and household management.     Short Term Goals (STGs); to be met within 4 weeks.  STG #1: Pt will report 2 out of 10 pain level with using L hand in ADLs.  STG #2: Pt will demonstrate independence with issued HEP.   STG #3: Pt will demo improved  L MF ESPINAL to WNLs for use gripping objects.    PLAN   Plan of Care Certification: 2/24/2022 to 4/22/22.     Outpatient Occupational Therapy 2 times weekly for 8 weeks to include the following interventions: Paraffin, Fluidotherapy, Manual therapy/joint mobilizations, Therapeutic exercises/activities., Strengthening, Orthotic Fabrication/Fit/Training and Edema Control.      Margaret Boasberg, OT      I CERTIFY THE NEED FOR THESE SERVICES FURNISHED UNDER THIS PLAN OF TREATMENT AND WHILE UNDER MY CARE  Physician's comments:      Physician's Signature: ___________________________________________________

## 2022-03-10 ENCOUNTER — CLINICAL SUPPORT (OUTPATIENT)
Dept: REHABILITATION | Facility: HOSPITAL | Age: 32
End: 2022-03-10
Payer: COMMERCIAL

## 2022-03-10 DIAGNOSIS — M25.60 STIFFNESS IN JOINT: Primary | ICD-10-CM

## 2022-03-10 DIAGNOSIS — R53.1 WEAKNESS: ICD-10-CM

## 2022-03-10 PROCEDURE — 97018 PARAFFIN BATH THERAPY: CPT | Mod: PO

## 2022-03-10 PROCEDURE — 97140 MANUAL THERAPY 1/> REGIONS: CPT | Mod: PO

## 2022-03-10 PROCEDURE — 97110 THERAPEUTIC EXERCISES: CPT | Mod: PO

## 2022-03-10 NOTE — PROGRESS NOTES
Occupational Therapy Treatment Note     Date: 3/10/2022  Name: Angelito Mills  Clinic Number: [id number]    Therapy Diagnosis:   Encounter Diagnoses   Name Primary?   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   Response to previous treatment: increased ROM    Pain: 0/10  Location: left finger    Objective     Angelito received the following supervised modalities after being cleared for contradictions for 10 minutes:   -Paraffin to decrease pain and stiffness and increase tissue elasticity    Angelito received the following manual therapy techniques for 10 minutes:   -IASTM to decrease stiffness and adhesions in surrounding tissues    Angelito received therapeutic exercises for 20 minutes including:  -TGEs and blocking exercises x 10 reps each  -green theraputty dowel pressing, gripping, roll and pinch and extension blooms x 5 min  -green therabar smiles, frowns and twists 2 x 10 reps  -pom pom  with black clothespin x 1 container    Observation/Appearance: mild edema noted; pt presented wearing mallet splint     Edema. Measured in centimeters.    2/24/2022 2/24/2022     Left Right   Long:         P1 6.9 6.5    PIP 6.9 6.7   P2 5.9 5.9    DIP 5.5 5.5   P3 5.2 5.3         Hand ROM. Measured in  degrees.    2/24/2022 2/24/2022     Left Right           Long:  MP 80 80              PIP 90 105              DIP 30 70              ESPINAL                   Strength (Dynamometer) and Pinch Strength (Pinch Gauge)  Measured in pounds.    2/24/2022 2/24/2022     Left Right   Rung II 85 125   Key Pinch 20 22   3pt Pinch 19 24     Home Exercises and Education Provided     Education provided:   - Continue HEP  - Progress towards goals     Written Home Exercises Provided: Patient instructed to cont prior HEP.  Exercises were reviewed and Angelito was able to demonstrate them prior to the end of the session.  Angelito demonstrated good  understanding of the HEP provided.   .   See EMR under Patient Instructions for exercises provided prior visit.        Assessment     Pt performed well in session. No pain reported, and pt reports no difficulty with functional use of L hand. All goals have been met. Plan to D/C to HEP.     Anticipated barriers to occupational therapy: none    Pt's spiritual, cultural and educational needs considered and pt agreeable to plan of care and goals.    Goals:  The following goals were discussed with the patient and patient is in agreement with them as to be addressed in the treatment plan.   Long Term Goals (LTGs); to be met by discharge.  LTG #1: Pt will report no pain with gripping and lifting tasks.  MET  LTG #2: Pt will return to prior level of function for ADLs and household management. MET     Short Term Goals (STGs); to be met within 4 weeks.  STG #1: Pt will report 2 out of 10 pain level with using L hand in ADLs.  MET  STG #2: Pt will demonstrate independence with issued HEP. MET  STG #3: Pt will demo improved  L MF ESPINAL to WNLs for use gripping objects.  MET    Plan   D/C to HEP      Margaret Boasberg, OT

## 2022-03-29 ENCOUNTER — OFFICE VISIT (OUTPATIENT)
Dept: PRIMARY CARE CLINIC | Facility: CLINIC | Age: 32
End: 2022-03-29
Payer: COMMERCIAL

## 2022-03-29 VITALS
SYSTOLIC BLOOD PRESSURE: 112 MMHG | DIASTOLIC BLOOD PRESSURE: 62 MMHG | HEART RATE: 75 BPM | TEMPERATURE: 98 F | OXYGEN SATURATION: 97 % | BODY MASS INDEX: 28.15 KG/M2 | WEIGHT: 201.06 LBS | HEIGHT: 71 IN

## 2022-03-29 DIAGNOSIS — Z00.00 LABORATORY EXAM ORDERED AS PART OF ROUTINE GENERAL MEDICAL EXAMINATION: ICD-10-CM

## 2022-03-29 DIAGNOSIS — Z67.90 UNSPECIFIED BLOOD TYPE, RH POSITIVE: ICD-10-CM

## 2022-03-29 DIAGNOSIS — Z00.00 ANNUAL PHYSICAL EXAM: Primary | ICD-10-CM

## 2022-03-29 DIAGNOSIS — J30.89 ENVIRONMENTAL AND SEASONAL ALLERGIES: ICD-10-CM

## 2022-03-29 DIAGNOSIS — Z87.11 HISTORY OF GASTRIC ULCER: ICD-10-CM

## 2022-03-29 PROCEDURE — 99395 PREV VISIT EST AGE 18-39: CPT | Mod: S$GLB,,, | Performed by: FAMILY MEDICINE

## 2022-03-29 PROCEDURE — 99999 PR PBB SHADOW E&M-EST. PATIENT-LVL IV: CPT | Mod: PBBFAC,,, | Performed by: FAMILY MEDICINE

## 2022-03-29 PROCEDURE — 99395 PR PREVENTIVE VISIT,EST,18-39: ICD-10-PCS | Mod: S$GLB,,, | Performed by: FAMILY MEDICINE

## 2022-03-29 PROCEDURE — 99999 PR PBB SHADOW E&M-EST. PATIENT-LVL IV: ICD-10-PCS | Mod: PBBFAC,,, | Performed by: FAMILY MEDICINE

## 2022-03-29 RX ORDER — MONTELUKAST SODIUM 10 MG/1
10 TABLET ORAL DAILY
Qty: 90 TABLET | Refills: 3 | Status: SHIPPED | OUTPATIENT
Start: 2022-03-29 | End: 2023-01-06 | Stop reason: SDUPTHER

## 2022-03-29 NOTE — PROGRESS NOTES
Subjective:       Patient ID: Angelito Mills is a 31 y.o. male.    Chief Complaint: Annual Exam      30 yo male presents for annual physical exam.    He is doing overall well.    Lipid disorders/ASCVD risk (ages >/= 45 or >/= 20 if increased risk ): Ordered  DM (>45y yearly or if obese, HTN): Ordered  Hepatitis C: Ordered    The following portions of the patient's history were reviewed and updated as appropriate: allergies, current medications, past family history, past medical history, past social history, past surgical history and problem list.    Review of Systems   Constitutional: Negative for activity change, appetite change, chills, diaphoresis, fatigue, fever and unexpected weight change.   HENT: Negative for nasal congestion, dental problem, facial swelling, hearing loss, nosebleeds, postnasal drip, rhinorrhea, sore throat, tinnitus and trouble swallowing.    Eyes: Negative for pain, discharge, itching and visual disturbance.   Respiratory: Negative for apnea, chest tightness, shortness of breath, wheezing and stridor.    Cardiovascular: Negative for chest pain, palpitations and leg swelling.   Gastrointestinal: Negative for abdominal distention, abdominal pain, blood in stool, constipation, diarrhea, nausea, rectal pain and vomiting.   Endocrine: Negative for cold intolerance, heat intolerance, polydipsia and polyuria.   Genitourinary: Negative for difficulty urinating, dysuria, frequency, hematuria and urgency.   Musculoskeletal: Negative for arthralgias, gait problem, joint swelling, myalgias, neck pain and neck stiffness.   Integumentary:  Negative for color change and rash.   Neurological: Negative for dizziness, tremors, seizures, syncope, facial asymmetry, weakness and headaches.   Hematological: Negative for adenopathy. Does not bruise/bleed easily.   Psychiatric/Behavioral: Negative for agitation, confusion, dysphoric mood, hallucinations, self-injury and suicidal ideas. The patient is not  "hyperactive.           Objective:       Vitals:    03/29/22 1533   BP: 112/62   Pulse: 75   Temp: 97.9 °F (36.6 °C)   TempSrc: Oral   SpO2: 97%   Weight: 91.2 kg (201 lb 1 oz)   Height: 5' 11" (1.803 m)     Physical Exam  Constitutional:       General: He is not in acute distress.     Appearance: He is well-developed. He is not diaphoretic.   HENT:      Head: Normocephalic and atraumatic.      Right Ear: External ear normal.      Left Ear: External ear normal.   Eyes:      General: No scleral icterus.        Right eye: No discharge.         Left eye: No discharge.      Conjunctiva/sclera: Conjunctivae normal.      Pupils: Pupils are equal, round, and reactive to light.   Neck:      Thyroid: No thyromegaly.   Cardiovascular:      Rate and Rhythm: Normal rate and regular rhythm.      Heart sounds: Normal heart sounds. No murmur heard.    No friction rub. No gallop.   Pulmonary:      Effort: Pulmonary effort is normal. No respiratory distress.      Breath sounds: Normal breath sounds.   Chest:      Chest wall: No tenderness.   Abdominal:      General: Bowel sounds are normal. There is no distension.      Palpations: Abdomen is soft. There is no mass.      Tenderness: There is no abdominal tenderness. There is no guarding or rebound.   Musculoskeletal:         General: Normal range of motion.      Cervical back: Normal range of motion and neck supple.   Lymphadenopathy:      Cervical: No cervical adenopathy.   Skin:     General: Skin is warm.      Capillary Refill: Capillary refill takes less than 2 seconds.      Findings: No rash.   Neurological:      Mental Status: He is alert and oriented to person, place, and time.      Cranial Nerves: No cranial nerve deficit.      Motor: No abnormal muscle tone.      Coordination: Coordination normal.      Deep Tendon Reflexes: Reflexes normal.   Psychiatric:         Thought Content: Thought content normal.         Judgment: Judgment normal.         Assessment:       1. Annual " physical exam    2. History of gastric ulcer    3. Environmental and seasonal allergies    4. Laboratory exam ordered as part of routine general medical examination    5. Unspecified blood type, Rh positive        Plan:       1. Annual physical exam  Age appropriate prevention guidelines implemented, unless patient refused  Encourage Advanced directives  Labs ordered   Immunizations reviewed. Encourage yearly influenza vaccine.  Patient Counseling:  --Nutrition: Encourage healthy food choices, adequate water intake, moderate sodium/caffeine intake, diet low in saturated fat and cholesterol. Importance of caloric balance and sufficient intake of fresh fruits, vegetables, fiber, calcium, iron, and 1 mg of folate supplement per day (for females capable of pregnancy).  --Exercise: Stressed the importance of regular exercise.   --Substance Abuse: Abstinence from tobacco products. No more than 2 alcoholic drinks per day in men or 1 alcoholic drink per day in women. Avoid illicit drugs, driving or other dangerous activities under the influence. In the event of abuse, treatment offered.   --Sexuality: Safe sex practices to avoid sexually transmitted diseases; careful partner selection, use of condoms and contraceptive alternatives, avoidance of unintended pregnancy in fertile women of child-bearing age  --Injury prevention: encourage safety belts, safety helmets, smoke detector.  --Dental health: Discussed importance of regular tooth brushing X2 daily, flossing X1 daily, and routine dental visits.  --Encourage use of sun screen, wear sun protective clothing  --Encourage routine eye exams    2. History of gastric ulcer  No recent bleeding or abdominal pain. Asymptomatic. Not on PPI or H2 blocker. Was told to avoid NSAIDs, hx of scoping.    3. Environmental and seasonal allergies  -     montelukast (SINGULAIR) 10 mg tablet; Take 1 tablet (10 mg total) by mouth once daily.  Dispense: 90 tablet; Refill: 3    4. Laboratory exam  ordered as part of routine general medical examination  -     CBC Auto Differential; Future  -     Comprehensive Metabolic Panel; Future  -     Lipid Panel; Future  -     TSH; Future    5. Unspecified blood type, Rh positive  -     TYPE & SCREEN; Future; Expected date: 03/29/2022      Disclaimer: This note has been generated using voice-recognition software. There may be typographical errors that have been missed during proof-reading

## 2022-03-29 NOTE — PROGRESS NOTES
Subjective:       Patient ID: Angelito Mills is a 31 y.o. male.    Chief Complaint: Annual Exam    Blood in stool. PmHx of stomach ulcer. BC powder (aspirin).     Diet     . Good exercise    pmhx of allergic rhinitis.     dtap vaccination status?     Blood work.    covid vaccination UTD.    Review of Systems   Constitutional: Negative for activity change and unexpected weight change.   HENT: Negative for hearing loss, rhinorrhea and trouble swallowing.    Eyes: Negative for discharge and visual disturbance.   Respiratory: Negative for chest tightness and wheezing.    Cardiovascular: Negative for chest pain and palpitations.   Gastrointestinal: Positive for blood in stool. Negative for constipation, diarrhea and vomiting.   Endocrine: Negative for polydipsia and polyuria.   Genitourinary: Negative for difficulty urinating, hematuria and urgency.   Musculoskeletal: Negative for arthralgias, joint swelling and neck pain.   Neurological: Negative for weakness and headaches.   Psychiatric/Behavioral: Negative for confusion and dysphoric mood.         Objective:      Physical Exam    Assessment:       Problem List Items Addressed This Visit    None         Plan:       ***

## 2022-04-05 ENCOUNTER — LAB VISIT (OUTPATIENT)
Dept: LAB | Facility: HOSPITAL | Age: 32
End: 2022-04-05
Attending: FAMILY MEDICINE
Payer: COMMERCIAL

## 2022-04-05 DIAGNOSIS — Z00.00 LABORATORY EXAM ORDERED AS PART OF ROUTINE GENERAL MEDICAL EXAMINATION: ICD-10-CM

## 2022-04-05 LAB
ALBUMIN SERPL BCP-MCNC: 3.9 G/DL (ref 3.5–5.2)
ALP SERPL-CCNC: 51 U/L (ref 55–135)
ALT SERPL W/O P-5'-P-CCNC: 17 U/L (ref 10–44)
ANION GAP SERPL CALC-SCNC: 11 MMOL/L (ref 8–16)
AST SERPL-CCNC: 28 U/L (ref 10–40)
BASOPHILS # BLD AUTO: 0.06 K/UL (ref 0–0.2)
BASOPHILS NFR BLD: 1 % (ref 0–1.9)
BILIRUB SERPL-MCNC: 0.3 MG/DL (ref 0.1–1)
BUN SERPL-MCNC: 17 MG/DL (ref 6–20)
CALCIUM SERPL-MCNC: 8.8 MG/DL (ref 8.7–10.5)
CHLORIDE SERPL-SCNC: 103 MMOL/L (ref 95–110)
CHOLEST SERPL-MCNC: 159 MG/DL (ref 120–199)
CHOLEST/HDLC SERPL: 2.9 {RATIO} (ref 2–5)
CO2 SERPL-SCNC: 24 MMOL/L (ref 23–29)
CREAT SERPL-MCNC: 1.2 MG/DL (ref 0.5–1.4)
DIFFERENTIAL METHOD: NORMAL
EOSINOPHIL # BLD AUTO: 0.3 K/UL (ref 0–0.5)
EOSINOPHIL NFR BLD: 5.9 % (ref 0–8)
ERYTHROCYTE [DISTWIDTH] IN BLOOD BY AUTOMATED COUNT: 12.1 % (ref 11.5–14.5)
EST. GFR  (AFRICAN AMERICAN): >60 ML/MIN/1.73 M^2
EST. GFR  (NON AFRICAN AMERICAN): >60 ML/MIN/1.73 M^2
GLUCOSE SERPL-MCNC: 103 MG/DL (ref 70–110)
HCT VFR BLD AUTO: 43.5 % (ref 40–54)
HDLC SERPL-MCNC: 54 MG/DL (ref 40–75)
HDLC SERPL: 34 % (ref 20–50)
HGB BLD-MCNC: 14.1 G/DL (ref 14–18)
IMM GRANULOCYTES # BLD AUTO: 0.02 K/UL (ref 0–0.04)
IMM GRANULOCYTES NFR BLD AUTO: 0.3 % (ref 0–0.5)
LDLC SERPL CALC-MCNC: 80.2 MG/DL (ref 63–159)
LYMPHOCYTES # BLD AUTO: 2.1 K/UL (ref 1–4.8)
LYMPHOCYTES NFR BLD: 36.7 % (ref 18–48)
MCH RBC QN AUTO: 30.7 PG (ref 27–31)
MCHC RBC AUTO-ENTMCNC: 32.4 G/DL (ref 32–36)
MCV RBC AUTO: 95 FL (ref 82–98)
MONOCYTES # BLD AUTO: 0.5 K/UL (ref 0.3–1)
MONOCYTES NFR BLD: 9 % (ref 4–15)
NEUTROPHILS # BLD AUTO: 2.7 K/UL (ref 1.8–7.7)
NEUTROPHILS NFR BLD: 47.1 % (ref 38–73)
NONHDLC SERPL-MCNC: 105 MG/DL
NRBC BLD-RTO: 0 /100 WBC
PLATELET # BLD AUTO: 301 K/UL (ref 150–450)
PMV BLD AUTO: 10.2 FL (ref 9.2–12.9)
POTASSIUM SERPL-SCNC: 3.8 MMOL/L (ref 3.5–5.1)
PROT SERPL-MCNC: 6.7 G/DL (ref 6–8.4)
RBC # BLD AUTO: 4.6 M/UL (ref 4.6–6.2)
SODIUM SERPL-SCNC: 138 MMOL/L (ref 136–145)
TRIGL SERPL-MCNC: 124 MG/DL (ref 30–150)
TSH SERPL DL<=0.005 MIU/L-ACNC: 1.18 UIU/ML (ref 0.4–4)
WBC # BLD AUTO: 5.78 K/UL (ref 3.9–12.7)

## 2022-04-05 PROCEDURE — 85025 COMPLETE CBC W/AUTO DIFF WBC: CPT | Performed by: FAMILY MEDICINE

## 2022-04-05 PROCEDURE — 84443 ASSAY THYROID STIM HORMONE: CPT | Performed by: FAMILY MEDICINE

## 2022-04-05 PROCEDURE — 80061 LIPID PANEL: CPT | Performed by: FAMILY MEDICINE

## 2022-04-05 PROCEDURE — 80053 COMPREHEN METABOLIC PANEL: CPT | Performed by: FAMILY MEDICINE

## 2022-04-05 PROCEDURE — 36415 COLL VENOUS BLD VENIPUNCTURE: CPT | Mod: PN | Performed by: FAMILY MEDICINE

## 2022-04-06 ENCOUNTER — TELEPHONE (OUTPATIENT)
Dept: PRIMARY CARE CLINIC | Facility: CLINIC | Age: 32
End: 2022-04-06
Payer: COMMERCIAL

## 2022-04-06 ENCOUNTER — PATIENT MESSAGE (OUTPATIENT)
Dept: PRIMARY CARE CLINIC | Facility: CLINIC | Age: 32
End: 2022-04-06
Payer: COMMERCIAL

## 2022-04-06 NOTE — TELEPHONE ENCOUNTER
----- Message from Arelis Varma MD sent at 4/6/2022  8:34 AM CDT -----  Please let patient know message sent to portal    Hello,    Normal thyroid function test    Liver functions are normal limits.  Normal kidney function.    Alkaline phosphatase is not life-threatening.    Cholesterol looks good.    Normal cell count.  No anemia.    Excellent!

## 2022-06-29 ENCOUNTER — PATIENT MESSAGE (OUTPATIENT)
Dept: PRIMARY CARE CLINIC | Facility: CLINIC | Age: 32
End: 2022-06-29
Payer: COMMERCIAL

## 2022-06-29 ENCOUNTER — TELEPHONE (OUTPATIENT)
Dept: PRIMARY CARE CLINIC | Facility: CLINIC | Age: 32
End: 2022-06-29
Payer: COMMERCIAL

## 2022-06-29 NOTE — TELEPHONE ENCOUNTER
My chart message sent to patient your refill request for montelukast should have refills remaining. It was filled on 3/29/2022 for 90 days with 3 refill

## 2023-01-06 ENCOUNTER — PATIENT MESSAGE (OUTPATIENT)
Dept: PRIMARY CARE CLINIC | Facility: CLINIC | Age: 33
End: 2023-01-06
Payer: COMMERCIAL

## 2023-01-25 ENCOUNTER — PATIENT MESSAGE (OUTPATIENT)
Dept: ADMINISTRATIVE | Facility: HOSPITAL | Age: 33
End: 2023-01-25
Payer: COMMERCIAL

## 2023-03-21 ENCOUNTER — OFFICE VISIT (OUTPATIENT)
Dept: PRIMARY CARE CLINIC | Facility: CLINIC | Age: 33
End: 2023-03-21
Payer: COMMERCIAL

## 2023-03-21 VITALS
SYSTOLIC BLOOD PRESSURE: 122 MMHG | DIASTOLIC BLOOD PRESSURE: 68 MMHG | BODY MASS INDEX: 26.68 KG/M2 | OXYGEN SATURATION: 98 % | HEIGHT: 71 IN | TEMPERATURE: 98 F | HEART RATE: 75 BPM | RESPIRATION RATE: 18 BRPM | WEIGHT: 190.56 LBS

## 2023-03-21 DIAGNOSIS — J30.89 ENVIRONMENTAL AND SEASONAL ALLERGIES: ICD-10-CM

## 2023-03-21 DIAGNOSIS — Z00.00 ANNUAL PHYSICAL EXAM: Primary | ICD-10-CM

## 2023-03-21 DIAGNOSIS — F41.1 GAD (GENERALIZED ANXIETY DISORDER): ICD-10-CM

## 2023-03-21 PROBLEM — F41.9 ANXIETY: Status: ACTIVE | Noted: 2023-03-21

## 2023-03-21 PROBLEM — M25.642 FINGER STIFFNESS, LEFT: Status: RESOLVED | Noted: 2022-01-14 | Resolved: 2023-03-21

## 2023-03-21 PROBLEM — R53.1 WEAKNESS: Status: RESOLVED | Noted: 2022-02-24 | Resolved: 2023-03-21

## 2023-03-21 PROBLEM — M25.60 STIFFNESS IN JOINT: Status: RESOLVED | Noted: 2022-02-24 | Resolved: 2023-03-21

## 2023-03-21 PROCEDURE — 99999 PR PBB SHADOW E&M-EST. PATIENT-LVL IV: ICD-10-PCS | Mod: PBBFAC,,, | Performed by: FAMILY MEDICINE

## 2023-03-21 PROCEDURE — 99395 PR PREVENTIVE VISIT,EST,18-39: ICD-10-PCS | Mod: S$GLB,,, | Performed by: FAMILY MEDICINE

## 2023-03-21 PROCEDURE — 99395 PREV VISIT EST AGE 18-39: CPT | Mod: S$GLB,,, | Performed by: FAMILY MEDICINE

## 2023-03-21 PROCEDURE — 99999 PR PBB SHADOW E&M-EST. PATIENT-LVL IV: CPT | Mod: PBBFAC,,, | Performed by: FAMILY MEDICINE

## 2023-03-21 RX ORDER — MONTELUKAST SODIUM 10 MG/1
10 TABLET ORAL DAILY
Qty: 90 TABLET | Refills: 3 | Status: SHIPPED | OUTPATIENT
Start: 2023-03-21

## 2023-03-21 RX ORDER — BUSPIRONE HYDROCHLORIDE 5 MG/1
5 TABLET ORAL 2 TIMES DAILY
Qty: 180 TABLET | Refills: 3 | Status: SHIPPED | OUTPATIENT
Start: 2023-03-21

## 2023-03-21 RX ORDER — PREDNISONE 20 MG/1
40 TABLET ORAL
COMMUNITY
Start: 2023-03-01 | End: 2023-03-21 | Stop reason: ALTCHOICE

## 2023-03-21 RX ORDER — BUSPIRONE HYDROCHLORIDE 5 MG/1
5 TABLET ORAL 2 TIMES DAILY
COMMUNITY
Start: 2022-12-22 | End: 2023-03-21 | Stop reason: SDUPTHER

## 2023-03-21 NOTE — PROGRESS NOTES
"Subjective:       Patient ID: Angelito Mills is a 32 y.o. male.    Chief Complaint: Annual Exam (Pt in for annual)    Here for annual exam and establish care.  Seasonal allergy stable on current regimen.  Was prescribed low-dose buspirone a few months ago says this helped significantly with his anxiety.  Feels nervous/anxious for much of the day, relieved by buspirone, no adverse side effects.    Review of Systems   Constitutional:  Negative for chills, fatigue and fever.   HENT:  Negative for congestion.    Eyes:  Negative for visual disturbance.   Respiratory:  Negative for cough and shortness of breath.    Cardiovascular:  Negative for chest pain.   Gastrointestinal:  Negative for abdominal pain, nausea and vomiting.   Genitourinary:  Negative for difficulty urinating.   Musculoskeletal:  Negative for arthralgias.   Skin:  Negative for rash.   Allergic/Immunologic: Positive for environmental allergies.   Neurological:  Negative for dizziness.   Hematological:  Does not bruise/bleed easily.   Psychiatric/Behavioral:  Negative for agitation and confusion. The patient is nervous/anxious.      Objective:      Vitals:    03/21/23 1521   BP: 122/68   BP Location: Left arm   Patient Position: Sitting   BP Method: Medium (Manual)   Pulse: 75   Resp: 18   Temp: 97.7 °F (36.5 °C)   TempSrc: Temporal   SpO2: 98%   Weight: 86.4 kg (190 lb 9.4 oz)   Height: 5' 11" (1.803 m)     Physical Exam  Vitals and nursing note reviewed.   Constitutional:       General: He is not in acute distress.     Appearance: Normal appearance. He is well-developed.   HENT:      Head: Normocephalic and atraumatic.   Cardiovascular:      Rate and Rhythm: Normal rate and regular rhythm.      Heart sounds: Normal heart sounds.   Pulmonary:      Effort: Pulmonary effort is normal.      Breath sounds: Normal breath sounds.   Abdominal:      General: There is no distension.      Tenderness: There is no abdominal tenderness.   Musculoskeletal:      " Right lower leg: No edema.      Left lower leg: No edema.   Skin:     General: Skin is warm and dry.   Neurological:      Mental Status: He is alert and oriented to person, place, and time.   Psychiatric:         Mood and Affect: Mood normal.         Behavior: Behavior normal.       Lab Results   Component Value Date    WBC 5.78 04/05/2022    HGB 14.1 04/05/2022    HCT 43.5 04/05/2022     04/05/2022    CHOL 159 04/05/2022    TRIG 124 04/05/2022    HDL 54 04/05/2022    ALT 17 04/05/2022    AST 28 04/05/2022     04/05/2022    K 3.8 04/05/2022     04/05/2022    CREATININE 1.2 04/05/2022    BUN 17 04/05/2022    CO2 24 04/05/2022    TSH 1.176 04/05/2022    HGBA1C 5.2 02/18/2021      Assessment:       1. Annual physical exam    2. CHRISTINE (generalized anxiety disorder)    3. Environmental and seasonal allergies          Plan:       Annual physical exam  -     CBC Auto Differential; Future; Expected date: 03/21/2023  -     Comprehensive Metabolic Panel; Future; Expected date: 03/21/2023  -     Lipid Panel; Future; Expected date: 03/21/2023  -     TSH; Future; Expected date: 03/21/2023    CHRISTINE (generalized anxiety disorder)  -     busPIRone (BUSPAR) 5 MG Tab; Take 1 tablet (5 mg total) by mouth 2 (two) times daily.  Dispense: 180 tablet; Refill: 3    Environmental and seasonal allergies  -     montelukast (SINGULAIR) 10 mg tablet; Take 1 tablet (10 mg total) by mouth once daily.  Dispense: 90 tablet; Refill: 3      Medication List with Changes/Refills   Current Medications    CETIRIZINE (ZYRTEC) 10 MG TABLET    Take 1 tablet by mouth once daily.    DIPHENHYDRAMINE (BENADRYL) 25 MG CAPSULE    Take 25 mg by mouth every 6 (six) hours as needed for Itching.    FLUTICASONE PROPIONATE (FLONASE) 50 MCG/ACTUATION NASAL SPRAY    2 sprays (100 mcg total) by Each Nostril route once daily.    KETOTIFEN (ZADITOR) 0.025 % (0.035 %) OPHTHALMIC SOLUTION    1 drop 2 (two) times daily.    MULTIVITAMIN ORAL    multivitamin     OLOPATADINE (PATANOL) 0.1 % OPHTHALMIC SOLUTION    Place 1 drop into both eyes 2 (two) times daily.    PSEUDOEPHEDRINE (SUDAFED) 120 MG 12 HR TABLET    Sudafed 12 Hour   Changed and/or Refilled Medications    Modified Medication Previous Medication    BUSPIRONE (BUSPAR) 5 MG TAB busPIRone (BUSPAR) 5 MG Tab       Take 1 tablet (5 mg total) by mouth 2 (two) times daily.    Take 5 mg by mouth 2 (two) times daily.    MONTELUKAST (SINGULAIR) 10 MG TABLET montelukast (SINGULAIR) 10 mg tablet       Take 1 tablet (10 mg total) by mouth once daily.    Take 1 tablet (10 mg total) by mouth once daily.   Discontinued Medications    DIPHTH,PERTUS,ACELL,,TETANUS (BOOSTRIX TDAP) 2.5-8-5 LF-MCG-LF/0.5ML SYRG INJECTION    Inject into the muscle.    PREDNISONE (DELTASONE) 20 MG TABLET    Take 40 mg by mouth.

## 2023-04-06 ENCOUNTER — PATIENT MESSAGE (OUTPATIENT)
Dept: PRIMARY CARE CLINIC | Facility: CLINIC | Age: 33
End: 2023-04-06
Payer: COMMERCIAL

## 2023-04-06 RX ORDER — BUTALBITAL, ACETAMINOPHEN AND CAFFEINE 50; 325; 40 MG/1; MG/1; MG/1
1 TABLET ORAL EVERY 4 HOURS PRN
Qty: 30 TABLET | Refills: 1 | Status: SHIPPED | OUTPATIENT
Start: 2023-04-06 | End: 2023-04-21

## 2023-04-19 ENCOUNTER — TELEPHONE (OUTPATIENT)
Dept: OTOLARYNGOLOGY | Facility: CLINIC | Age: 33
End: 2023-04-19
Payer: COMMERCIAL

## 2023-04-19 ENCOUNTER — OFFICE VISIT (OUTPATIENT)
Dept: OTOLARYNGOLOGY | Facility: CLINIC | Age: 33
End: 2023-04-19
Payer: COMMERCIAL

## 2023-04-19 VITALS
BODY MASS INDEX: 27.32 KG/M2 | HEIGHT: 71 IN | HEART RATE: 78 BPM | WEIGHT: 195.13 LBS | DIASTOLIC BLOOD PRESSURE: 78 MMHG | SYSTOLIC BLOOD PRESSURE: 120 MMHG

## 2023-04-19 DIAGNOSIS — J32.9 CHRONIC RECURRENT SINUSITIS: ICD-10-CM

## 2023-04-19 DIAGNOSIS — J30.9 ALLERGIC RHINITIS, UNSPECIFIED SEASONALITY, UNSPECIFIED TRIGGER: Primary | ICD-10-CM

## 2023-04-19 PROCEDURE — 99204 PR OFFICE/OUTPT VISIT, NEW, LEVL IV, 45-59 MIN: ICD-10-PCS | Mod: S$GLB,,, | Performed by: OTOLARYNGOLOGY

## 2023-04-19 PROCEDURE — 99999 PR PBB SHADOW E&M-EST. PATIENT-LVL III: CPT | Mod: PBBFAC,,, | Performed by: OTOLARYNGOLOGY

## 2023-04-19 PROCEDURE — 99999 PR PBB SHADOW E&M-EST. PATIENT-LVL III: ICD-10-PCS | Mod: PBBFAC,,, | Performed by: OTOLARYNGOLOGY

## 2023-04-19 PROCEDURE — 99204 OFFICE O/P NEW MOD 45 MIN: CPT | Mod: S$GLB,,, | Performed by: OTOLARYNGOLOGY

## 2023-04-19 NOTE — PROGRESS NOTES
Mr. Mills     Vitals:    23 1141   BP: 120/78   Pulse: 78       Chief Complaint:  Recurrent sinusitis     HPI:  Mr. Mills is a 32-year-old white male who presents with complaints of sinus pressure pain and congestion.  He states that he gets sinus infections at least 2-3 times per year requiring antibiotics.  He is currently on a Z-Oliver now.  His symptoms include facial pressure pain and purulent nasal discharge.  He denies any previous nasal trauma or surgeries.  He has been using a syringe type nasal irrigation 2-3 times weekly though he is using tap water.  He had seen an allergist in the past and is on Zyrtec, Singulair and Flonase.    SNOT22- 40 NOSE- 50    Review of Systems:  Constitutional:   weight loss or weight gain: Negative  Allergy/Immunologic:   Positive  Nasal Congestion/Obstruction:   Positive for nasal congestion  Nosebleeds:   Negative  Sinus infections:   Positive as above  Headache/Facial Pain:   Positive for facial pressure pain and headaches.  Snoring/GRANT:   Negative  Throat: Infections/Pain:   Negative  Hoarseness/Speech Disturbance:   Negative  Trauma Hx:  Negative    Cardiovascular:  M/I Angina: Negative  Hypertension: Negative  Endocrine:    DM/Steroids: Negative  GI:   Dysphagia/Reflux: Negative  :   GYN Pregnancy: Negative  Renal:   Dialysis: Negative  Lymphatic:   Neck Mass/Lymphadenopathy: Negative  Muscoloskeletal:   Negative  Hematologic:   Bleeding Disorders/Anemia: Negative  Neurologic:    Cranial/Neuralgia: Negative  Pulmonary:   Asthma/SOB/Cough: Negative  Skin Disorders: Negative    Past Medical/Surgical/Family/Social History:    ENT Surgery: Negative  Occupational Exposure: Negative   Problems: Negative  Cancer: Negative    Past Family History:   Family history of Cancer: Negative    Past Social History:   Tobacco: Nonsmoker   Alcohol: Social Drinker      Allergies and medications: Reviewed per med card.    Physical Examination:  Ears:   External auditory canals:   Clear   Hearing: Grossly intact   Tympanic Membranes: Clear  Nose:   External: Normal   Intranasal:  Septal spur to the left with 2+ turbinates which do decongest somewhat.  Mouth:   Intraorally: Lips, teeth, and gums: Normal   Oropharynx: Normal   Mucosa: Normal   Tongue: Normal  Throat:      Palate: Normal palate with elevation   Tonsils:  1+ bilaterally   Posterior Pharynx: Normal  Fiberoptic exam: Not performed  Head/Face:     Inspection: Normal and atraumatic   Palpation/Percussion:  Tender to percussion in the ethmoid regions bilaterally.   Facial strength: Normal and symmetric   Salivary glands: Normal  Neck: Supple  Thyroid: No masses  Lymphatics: No nodes  Respiratory:   Effort: Normal  Eyes:   Ocular Mobility: Normal   Vision: Grossly intact  Neuro/Psych:   Cranial Nerves: Grossly Intact   Orientation: Normal   Mood/Affect: Normal      Assessment/Plan:  I have discussed my findings with him in detail as well as my recommendations for treatment.  I have given him literature on saline sinus rinses including its use with distilled water only described how this to be used in detail with him.  He will do this daily.  I have also recommended a trial of Nasacort nasal spray and I have described how this is to be administered as well.  I will order a Keek CT scan of his sinuses to evaluate these.  He will contact us after this is completed to arrange for follow-up.

## 2023-04-21 ENCOUNTER — OFFICE VISIT (OUTPATIENT)
Dept: NEUROLOGY | Facility: CLINIC | Age: 33
End: 2023-04-21
Payer: COMMERCIAL

## 2023-04-21 ENCOUNTER — LAB VISIT (OUTPATIENT)
Dept: LAB | Facility: HOSPITAL | Age: 33
End: 2023-04-21
Attending: FAMILY MEDICINE
Payer: COMMERCIAL

## 2023-04-21 VITALS
HEART RATE: 89 BPM | DIASTOLIC BLOOD PRESSURE: 83 MMHG | WEIGHT: 190.25 LBS | BODY MASS INDEX: 26.64 KG/M2 | SYSTOLIC BLOOD PRESSURE: 123 MMHG | HEIGHT: 71 IN

## 2023-04-21 DIAGNOSIS — G43.719 INTRACTABLE CHRONIC MIGRAINE WITHOUT AURA AND WITHOUT STATUS MIGRAINOSUS: Primary | ICD-10-CM

## 2023-04-21 DIAGNOSIS — Z00.00 ANNUAL PHYSICAL EXAM: ICD-10-CM

## 2023-04-21 LAB
ALBUMIN SERPL BCP-MCNC: 3.7 G/DL (ref 3.5–5.2)
ALP SERPL-CCNC: 53 U/L (ref 55–135)
ALT SERPL W/O P-5'-P-CCNC: 22 U/L (ref 10–44)
ANION GAP SERPL CALC-SCNC: 11 MMOL/L (ref 8–16)
AST SERPL-CCNC: 18 U/L (ref 10–40)
BASOPHILS # BLD AUTO: 0.04 K/UL (ref 0–0.2)
BASOPHILS NFR BLD: 0.4 % (ref 0–1.9)
BILIRUB SERPL-MCNC: 0.4 MG/DL (ref 0.1–1)
BUN SERPL-MCNC: 15 MG/DL (ref 6–20)
CALCIUM SERPL-MCNC: 9.2 MG/DL (ref 8.7–10.5)
CHLORIDE SERPL-SCNC: 103 MMOL/L (ref 95–110)
CHOLEST SERPL-MCNC: 168 MG/DL (ref 120–199)
CHOLEST/HDLC SERPL: 2.9 {RATIO} (ref 2–5)
CO2 SERPL-SCNC: 26 MMOL/L (ref 23–29)
CREAT SERPL-MCNC: 1.1 MG/DL (ref 0.5–1.4)
DIFFERENTIAL METHOD: ABNORMAL
EOSINOPHIL # BLD AUTO: 0.2 K/UL (ref 0–0.5)
EOSINOPHIL NFR BLD: 1.9 % (ref 0–8)
ERYTHROCYTE [DISTWIDTH] IN BLOOD BY AUTOMATED COUNT: 12.1 % (ref 11.5–14.5)
EST. GFR  (NO RACE VARIABLE): >60 ML/MIN/1.73 M^2
GLUCOSE SERPL-MCNC: 97 MG/DL (ref 70–110)
HCT VFR BLD AUTO: 43.2 % (ref 40–54)
HDLC SERPL-MCNC: 57 MG/DL (ref 40–75)
HDLC SERPL: 33.9 % (ref 20–50)
HGB BLD-MCNC: 13.8 G/DL (ref 14–18)
IMM GRANULOCYTES # BLD AUTO: 0.06 K/UL (ref 0–0.04)
IMM GRANULOCYTES NFR BLD AUTO: 0.5 % (ref 0–0.5)
LDLC SERPL CALC-MCNC: 94.6 MG/DL (ref 63–159)
LYMPHOCYTES # BLD AUTO: 3.4 K/UL (ref 1–4.8)
LYMPHOCYTES NFR BLD: 29.7 % (ref 18–48)
MCH RBC QN AUTO: 30.3 PG (ref 27–31)
MCHC RBC AUTO-ENTMCNC: 31.9 G/DL (ref 32–36)
MCV RBC AUTO: 95 FL (ref 82–98)
MONOCYTES # BLD AUTO: 1.1 K/UL (ref 0.3–1)
MONOCYTES NFR BLD: 9.6 % (ref 4–15)
NEUTROPHILS # BLD AUTO: 6.5 K/UL (ref 1.8–7.7)
NEUTROPHILS NFR BLD: 57.9 % (ref 38–73)
NONHDLC SERPL-MCNC: 111 MG/DL
NRBC BLD-RTO: 0 /100 WBC
PLATELET # BLD AUTO: 353 K/UL (ref 150–450)
PMV BLD AUTO: 9.4 FL (ref 9.2–12.9)
POTASSIUM SERPL-SCNC: 3.5 MMOL/L (ref 3.5–5.1)
PROT SERPL-MCNC: 7 G/DL (ref 6–8.4)
RBC # BLD AUTO: 4.55 M/UL (ref 4.6–6.2)
SODIUM SERPL-SCNC: 140 MMOL/L (ref 136–145)
TRIGL SERPL-MCNC: 82 MG/DL (ref 30–150)
TSH SERPL DL<=0.005 MIU/L-ACNC: 0.94 UIU/ML (ref 0.4–4)
WBC # BLD AUTO: 11.3 K/UL (ref 3.9–12.7)

## 2023-04-21 PROCEDURE — 99204 OFFICE O/P NEW MOD 45 MIN: CPT | Mod: S$GLB,,, | Performed by: PHYSICIAN ASSISTANT

## 2023-04-21 PROCEDURE — 85025 COMPLETE CBC W/AUTO DIFF WBC: CPT | Performed by: FAMILY MEDICINE

## 2023-04-21 PROCEDURE — 36415 COLL VENOUS BLD VENIPUNCTURE: CPT | Performed by: FAMILY MEDICINE

## 2023-04-21 PROCEDURE — 99999 PR PBB SHADOW E&M-EST. PATIENT-LVL IV: CPT | Mod: PBBFAC,,, | Performed by: PHYSICIAN ASSISTANT

## 2023-04-21 PROCEDURE — 99204 PR OFFICE/OUTPT VISIT, NEW, LEVL IV, 45-59 MIN: ICD-10-PCS | Mod: S$GLB,,, | Performed by: PHYSICIAN ASSISTANT

## 2023-04-21 PROCEDURE — 80053 COMPREHEN METABOLIC PANEL: CPT | Performed by: FAMILY MEDICINE

## 2023-04-21 PROCEDURE — 99999 PR PBB SHADOW E&M-EST. PATIENT-LVL IV: ICD-10-PCS | Mod: PBBFAC,,, | Performed by: PHYSICIAN ASSISTANT

## 2023-04-21 PROCEDURE — 84443 ASSAY THYROID STIM HORMONE: CPT | Performed by: FAMILY MEDICINE

## 2023-04-21 PROCEDURE — 80061 LIPID PANEL: CPT | Performed by: FAMILY MEDICINE

## 2023-04-21 RX ORDER — TOPIRAMATE 25 MG/1
TABLET ORAL
Qty: 60 TABLET | Refills: 11 | Status: SHIPPED | OUTPATIENT
Start: 2023-04-21 | End: 2024-04-12

## 2023-04-21 RX ORDER — SUMATRIPTAN 50 MG/1
50 TABLET, FILM COATED ORAL
Qty: 9 TABLET | Refills: 11 | Status: SHIPPED | OUTPATIENT
Start: 2023-04-21 | End: 2023-04-21

## 2023-04-21 RX ORDER — TOPIRAMATE 25 MG/1
TABLET ORAL
Qty: 60 TABLET | Refills: 11 | Status: SHIPPED | OUTPATIENT
Start: 2023-04-21 | End: 2023-04-21

## 2023-04-21 RX ORDER — SUMATRIPTAN 50 MG/1
50 TABLET, FILM COATED ORAL
Qty: 9 TABLET | Refills: 11 | Status: SHIPPED | OUTPATIENT
Start: 2023-04-21 | End: 2023-08-31

## 2023-04-21 NOTE — PATIENT INSTRUCTIONS
Supplements for Migraine Prevention     There are several supplements which have been shown in clinical studies to be used for migraine prevention:    Magnesium 400mg daily    Riboflavin (Vitamin B2) 400mg daily    CoQ10 100mg three times daily     
negative

## 2023-04-21 NOTE — PROGRESS NOTES
Name: Angelito Mills  Date: 04/21/2023  YOB: 1990      Subjective     Chief Complaint- Headache      HPI:   Angelito Mills is a 32 y.o. who presents to clinic for headache evaluation.     Headache history:   Age of onset -  Late 20's but restarted about a month ago with more frequent and more intense headaches  Location - L neck and radiating up to occipital and temple  Nature of pain -  throbbing, sharp   Prodrome -Neck/back tightness  Aura -  Denies   Duration of headache - 2-3 hours  Time to peak intensity - Unknown   Pain scale - range of intensity - 10 /10  Frequency -  Daily to every other day   Status Migrainosus history - Denies   Time of day of most headaches- nighttime, usually around 9pm     Associated symptoms with the headache, bolded items are positive:   Meningeal symptoms - photophobia, phonophobia, exercise intolerance   Nausea/vomitting  Nasal drainage   Visual blurriness   Pallor/flushing  Dizziness   Vertigo  Confusion  Impaired concentration   Pain worsened with physical activity   Neck pain     Cluster headache symptoms, bolded items are positive:   Swollen or droopy eyelid  Swelling under or around the eye (may affect both eyes)  Excessive tearing  Red eye  Rhinorrhea or one-sided nasal congestion   Red, flushed face   Forehead and facial sweating    Symptoms of increased intracranial pressure, bolded items are positive:   Whooshing sounds   Visual spots/blotches     Basilar migraine symptoms, bolded items are positive:  Dysarthria  Vertigo  Tinnitus  Hypacusia  Diplopia  Simultaneous visual symptoms in both temporal and nasal fields of both eyes  Ataxia  Reduced level of consciousness  Bilateral paresthesias    Headache Triggers, bolded items are positive:  Bright or flickering light  Strong smell  Vigorous exercise  Dietary factors   Visual strain   Weather changes  Exertion  Heat (hot weather, hot baths or showers)      Treatment history:  Resolution of headache with  sleep - yes   Meds tried:  Fioricet       Headache risk factors:   H/o TBI  - no   H/o Meningitis  - no   F/h Aneurysms  - Father (possibly he cannot remember)       PAST MEDICAL HISTORY:  Past Medical History:   Diagnosis Date    ADD (attention deficit disorder)     Allergy     Recurrent upper respiratory infection (URI)        PAST SURGICAL HISTORY:  History reviewed. No pertinent surgical history.    CURRENT MEDS:  Current Outpatient Medications   Medication Sig Dispense Refill    busPIRone (BUSPAR) 5 MG Tab Take 1 tablet (5 mg total) by mouth 2 (two) times daily. 180 tablet 3    cetirizine (ZYRTEC) 10 MG tablet Take 1 tablet by mouth once daily.      diphenhydrAMINE (BENADRYL) 25 mg capsule Take 25 mg by mouth every 6 (six) hours as needed for Itching.      fluticasone propionate (FLONASE) 50 mcg/actuation nasal spray 2 sprays (100 mcg total) by Each Nostril route once daily. 16 g 12    ketotifen (ZADITOR) 0.025 % (0.035 %) ophthalmic solution 1 drop 2 (two) times daily.      montelukast (SINGULAIR) 10 mg tablet Take 1 tablet (10 mg total) by mouth once daily. 90 tablet 3    MULTIVITAMIN ORAL multivitamin      pseudoephedrine (SUDAFED) 120 mg 12 hr tablet Sudafed 12 Hour      olopatadine (PATANOL) 0.1 % ophthalmic solution Place 1 drop into both eyes 2 (two) times daily. 10 mL 12    sumatriptan (IMITREX) 50 MG tablet Take 1 tablet (50 mg total) by mouth every 2 (two) hours as needed for Migraine. 9 tablet 11    topiramate (TOPAMAX) 25 MG tablet Take 1 tablet (25 mg total) by mouth every evening for 7 days, THEN 2 tablets (50 mg total) every evening. 60 tablet 11     No current facility-administered medications for this visit.       ALLERGIES:  Review of patient's allergies indicates:  No Known Allergies    FAMILY HISTORY:  Family History   Problem Relation Age of Onset    Cancer Mother         skin    Allergies Mother     Prostate cancer Father     Allergies Father     ADD / ADHD Brother     Allergies Brother      ADD / ADHD Brother     Allergies Brother     Allergic rhinitis Neg Hx     Angioedema Neg Hx     Asthma Neg Hx     Atopy Neg Hx     Eczema Neg Hx     Immunodeficiency Neg Hx     Rhinitis Neg Hx     Urticaria Neg Hx        SOCIAL HISTORY:  Social History     Tobacco Use    Smoking status: Never    Smokeless tobacco: Never   Substance Use Topics    Alcohol use: Yes     Alcohol/week: 6.0 standard drinks     Types: 6 Cans of beer per week         Review of Systems:  Review of Systems   Constitutional:  Negative for chills, fever and weight loss.   HENT:  Negative for ear discharge, ear pain, hearing loss, sinus pain, sore throat and tinnitus.    Eyes:  Negative for blurred vision, double vision and photophobia.   Respiratory:  Negative for cough, shortness of breath and wheezing.    Cardiovascular:  Negative for chest pain, palpitations and orthopnea.   Gastrointestinal:  Negative for constipation, diarrhea, nausea and vomiting.   Genitourinary:  Negative for dysuria, frequency and urgency.   Musculoskeletal:  Positive for neck pain. Negative for back pain and myalgias.   Neurological:  Positive for headaches. Negative for tingling, seizures, loss of consciousness and weakness.       Objective   Vitals:    04/21/23 0808   BP: 123/83   Pulse: 89           NEUROLOGICAL EXAMINATION:     MENTAL STATUS   Oriented to person, place, and time.   Level of consciousness: alert    CRANIAL NERVES     CN III, IV, VI   Pupils are equal, round, and reactive to light.  Extraocular motions are normal.     CN V   Facial sensation intact.     CN VII   Facial expression full, symmetric.     CN VIII   CN VIII normal.     CN IX, X   CN IX normal.   CN X normal.     CN XI   CN XI normal.     CN XII   CN XII normal.     MOTOR EXAM   Muscle bulk: normal    Strength   Strength 5/5 throughout.     REFLEXES     Reflexes   Right brachioradialis: 2+  Left brachioradialis: 2+  Right biceps: 2+  Left biceps: 2+  Right triceps: 2+  Left triceps:  2+  Right patellar: 2+  Left patellar: 2+  Right achilles: 2+  Left achilles: 2+  Right Cook: absent  Left Cook: absent  Right ankle clonus: absent  Left ankle clonus: absent    SENSORY EXAM   Light touch normal.   Vibration normal.   Proprioception normal.         Latest Imaging: None        Assessment     Angelito Mills is a 32 y.o. who presented to clinic today for the evaluation of headache.        Plan     My approach to every patient is multimodal.   I focused our discussion on addressing modifiable risk factors and trying to decrease them.  After discussion with the patient, I recommend the followin. Preventive medications; these medications have to be taken every single day to decrease headache frequency and severity; it takes at least minimum of few weeks to see any results; and have to be taken for at least 1 to 2 years. The medication should be started at a small dose, and increased if no significant side effects. Patient compliance is key for effectiveness of the preventive medications. (we discussed the options of beta-blockers, calcium channel blockers, SSRIs, SNRIs, neuron modulating like topiramate options)     Failed:       START:   Topamax 25mg QHS for one week if tolerated will increase to 50mg QHS      2. Acute (abortive) medications- these medications are to be taken only at the onset of severe headache and please limit a total of any combination of these medications to less than 6 days per month on average because they can cause rebound (medication overuse) headaches.     Failed:       START:   Sumatriptan 50mg     3. Natural approaches to increasing brain energy and serotonin:   Magnesium 400 mg daily  Vitamin B2 400 mg daily   Vitamin B12 1000 mcg daily      4. DIET: I recommend a diet that heavily favors fruits and vegetables while reducing carbs. More information is available upon request.     5. EXERCISE: Gentle movement exercises, concentrate on combination of muscle  building and aerobic. I also recommend adding gentle Yoga therapy. The goal is 150 minutes per week of moderate to rigorous exercise, but you should start at your own pace and progress slowly and safely as discussed today.    6. ANXIETY/STRESS- Control stressors with yoga, meditation, counseling, or other methods of mindfulness.     7. SLEEP- aim for 7-8 hrs of restful sleep per night.     8. FUN- Increase fun time spend with friends and family     Benefits, side effects, and alternatives were discussed extensively with the patient.?     Angelito verbally endorsed understanding of all the recommendations.?     Follow Up in 3 months    Problem List Items Addressed This Visit    None  Visit Diagnoses       Intractable chronic migraine without aura and without status migrainosus    -  Primary    Relevant Medications    sumatriptan (IMITREX) 50 MG tablet    topiramate (TOPAMAX) 25 MG tablet    Other Relevant Orders    Ambulatory referral/consult to Physical/Occupational Therapy              I spent a total of 45 minutes on the day of the visit. This includes face to face time and non-face to face time preparing to see the patient (eg, review of tests), obtaining and/or reviewing separately obtained history, documenting clinical information in the electronic or other health record, independently interpreting results and communicating results to the patient/family/caregiver, or care coordinator.      Mona Rodas PA-C  Supervising Physician Michael Guthrie MD was avalible for all questions during this exam.  [facility]

## 2023-04-24 ENCOUNTER — PATIENT MESSAGE (OUTPATIENT)
Dept: NEUROLOGY | Facility: CLINIC | Age: 33
End: 2023-04-24
Payer: COMMERCIAL

## 2023-04-25 DIAGNOSIS — G43.719 INTRACTABLE CHRONIC MIGRAINE WITHOUT AURA AND WITHOUT STATUS MIGRAINOSUS: Primary | ICD-10-CM

## 2023-04-25 RX ORDER — ONDANSETRON 8 MG/1
8 TABLET, ORALLY DISINTEGRATING ORAL 2 TIMES DAILY
Qty: 10 TABLET | Refills: 11 | Status: SHIPPED | OUTPATIENT
Start: 2023-04-25

## 2023-04-26 ENCOUNTER — HOSPITAL ENCOUNTER (OUTPATIENT)
Dept: RADIOLOGY | Facility: HOSPITAL | Age: 33
Discharge: HOME | End: 2023-04-26
Attending: OTOLARYNGOLOGY
Payer: COMMERCIAL

## 2023-04-26 DIAGNOSIS — J32.9 CHRONIC RECURRENT SINUSITIS: ICD-10-CM

## 2023-04-26 PROCEDURE — 70486 CT MAXILLOFACIAL W/O DYE: CPT | Mod: TC

## 2023-04-26 PROCEDURE — 70486 CT MAXILLOFACIAL W/O DYE: CPT | Mod: 26,,, | Performed by: RADIOLOGY

## 2023-04-26 PROCEDURE — 70486 CT MEDTRONIC SINUSES WITHOUT: ICD-10-PCS | Mod: 26,,, | Performed by: RADIOLOGY

## 2023-04-27 ENCOUNTER — PATIENT MESSAGE (OUTPATIENT)
Dept: OTOLARYNGOLOGY | Facility: CLINIC | Age: 33
End: 2023-04-27
Payer: COMMERCIAL

## 2023-05-01 ENCOUNTER — PATIENT MESSAGE (OUTPATIENT)
Dept: OTOLARYNGOLOGY | Facility: CLINIC | Age: 33
End: 2023-05-01
Payer: COMMERCIAL

## 2023-05-01 ENCOUNTER — DOCUMENTATION ONLY (OUTPATIENT)
Dept: OTOLARYNGOLOGY | Facility: CLINIC | Age: 33
End: 2023-05-01
Payer: COMMERCIAL

## 2023-05-01 NOTE — PROGRESS NOTES
His CT scans show evidence of chronic recurrent sinus disease affecting all of his paranasal sinuses.  It also confirms marked septal spur on the right contacting the lateral wall.  Fess addressing all of his paranasal sinuses, septoplasty, submucous resection of turbinates.

## 2023-05-11 ENCOUNTER — CLINICAL SUPPORT (OUTPATIENT)
Dept: REHABILITATION | Facility: HOSPITAL | Age: 33
End: 2023-05-11
Payer: COMMERCIAL

## 2023-05-11 DIAGNOSIS — G44.86 CERVICOGENIC HEADACHE: Primary | ICD-10-CM

## 2023-05-11 DIAGNOSIS — G43.719 INTRACTABLE CHRONIC MIGRAINE WITHOUT AURA AND WITHOUT STATUS MIGRAINOSUS: ICD-10-CM

## 2023-05-11 PROCEDURE — 97110 THERAPEUTIC EXERCISES: CPT | Mod: PO

## 2023-05-11 PROCEDURE — 97530 THERAPEUTIC ACTIVITIES: CPT | Mod: PO

## 2023-05-11 PROCEDURE — 97161 PT EVAL LOW COMPLEX 20 MIN: CPT | Mod: PO

## 2023-05-12 NOTE — PLAN OF CARE
"[facility]   Physical Therapy Initial Evaluation      Name: Angelito Mills  Clinic Number: [id number]    Therapy Diagnosis:   Encounter Diagnoses   Name Primary?    Cervicogenic headache Yes    Intractable chronic migraine without aura and without status migrainosus         Physician: Mona Rodas P*    Physician Orders: PT Eval and Treat  Medical Diagnosis from Referral: G43.719 (ICD-10-CM) - Intractable chronic migraine without aura and without status migrainosus  Evaluation Date: 5/11/2023  Authorization Period Expiration: 4/20/2024  Plan of Care Expiration: 8/3/2023  Visit # / Visits authorized: 1/ 1   FOTO #2: 1 / 5   FOTO: #3: 0 / 0    Precautions: Standard    Time In: 1600  Time Out: 1700  Total Appointment Time (timed & untimed codes): 60 minutes    SUBJECTIVE   Date of onset: March 2023    History of current condition - Angelito reports: History of headaches in late 20's that spontaneously resolved. Return of symptoms with increase in frequency and severity as of March 2023. Symptoms are unilateral, sharp / throbbing, no aura, 2-3 hour duration, nightly (around 9 PM), with photophobia and phonophobia and left neck pain beginning in the upper trap and travels superiorly up the neck and head culminating in a headache. Active and moving at job with no reports of overhead work. States he is right hand dominant.  No symptoms at all on right side. No history of migraines. No prior trauma or surgeries. No complaints of numbness, tingling, or weakness. Exercises almost daily with weight training and cardio.     Falls: None    Imaging, CT scan films (4/26/2023): "Diffuse paranasal sinus disease with air-fluid levels in the bilateral maxillary sinuses suggesting acute on chronic sinusitis."    Prior Therapy: None  Social History:  lives with their family  Occupation:   Prior Level of Function: No pain or difficulties with work duties, , and activities " of daily living.  Current Level of Function: Mild to severe pain and difficulties with work duties, , and activities of daily living.    Pain:  Current 0/10, worst 10/10, best 0/10   Location: Left upper trap, neck and head.  Description: Throbbing, Sharp, and unilateral  Aggravating Factors: Night Time  Easing Factors: rest    Patients goals: Resolve headaches.     Medical History:   Past Medical History:   Diagnosis Date    ADD (attention deficit disorder)     Allergy     Recurrent upper respiratory infection (URI)      Surgical History:   Angelito Mills  has no past surgical history on file.    Medications:   Angelito has a current medication list which includes the following prescription(s): buspirone, cetirizine, diphenhydramine, fluticasone propionate, ketotifen, montelukast, multivitamin, olopatadine, ondansetron, pseudoephedrine, sumatriptan, and topiramate.    Allergies:   Review of patient's allergies indicates:  No Known Allergies     OBJECTIVE     Observations:  Cervical: forward head and rounded shoulders  Thoracic: reduced kyphosis  Scapulae: bilateral depression (left > right), left scapular downwards rotation, anterior tipping, and abduction.      Active Range of Motion:  Cervical Spine   Action Degrees Dysfunction   Flexion 50 Hinging / shear at C6-7   Extension 60 Decreased upper cervical movement   Left Rotation 80 Slight cervical flexion   Right Rotation 75 Slight cervical flexion   Left Sidebend 25 Stretching right   Right Sidebend 25 Stretching left      Shoulder   Action Left Right   Flexion 180 degrees 180 degrees   Abduction 180 degrees 180 degrees   External Rotation 90 degrees 90 degrees       Manual Muscle Testing:  Shoulder Girdle   Action Left Right   Serratus Anterior 4 / 5 4+ / 5   Upper Traps 4 / 5 4+ / 5   Middle Traps 4- / 5 4 / 5   Lower Traps 4 / 5 4 / 5       Special Tests:  Positive (+) Tests = Cranio-cervical Flexion Test  Negative (-) Tests = Cervical Flexion  Rotation Test  Cranio-cervical Flexion Test:  Activation Score = 28 mmHg (6 second hold before activation of SCM)  Performance Index = 24 mmHg (fatigued)      Functional Movement & Mechanics:  Left scapula failed to achieve axilla position with elevation.      Palpation:  Hypomobility of C3, CT junction, and upper thoracic spine.  Hypermobility at C6-C7 with no reproduction of symptoms.      Limitation/Restriction for FOTO Neck Survey    Therapist reviewed FOTO scores for Angelito Mills on 5/11/2023.   FOTO documents entered into EPIC - see Media section.    Limitation Score: 69% > 40% (13 visits)       TREATMENT     Total Treatment time (time-based codes) separate from Evaluation: 22 minutes      Angelito received the treatments listed below:      Therapeutic Exercises to develop strength, endurance, and posture for 10 minutes including:  Chin tucks  Touchdowns  Prone Middle Trap, Level 1    Manual Therapy Techniques: Joint mobilizations were applied to the: Cervicothoracic Spine for 2 minutes, including:  Seated CT Junction Distraction, Grade V    Therapeutic Activities to improve functional performance for 10 minutes, including:  Education (see below)  Questions and answers    PATIENT EDUCATION AND HOME EXERCISES     Education provided:   Anatomy and Pathology.  Symptom management and plan of care progressions.  Home Exercise Program.    Written Home Exercises Provided: yes. Exercises were reviewed and Angelito was able to demonstrate them prior to the end of the session.  Angelito demonstrated good  understanding of the education provided. See EMR under Patient Instructions for exercises provided during therapy sessions.    ASSESSMENT     Angelito is a 32 y.o. male referred to outpatient Physical Therapy with a medical diagnosis of Intractable chronic migraine without aura and without status migrainosus. Patient presents with decreased strength, decreased scapular posture, joint hyper/hypo-mobility, and decreased endurance  of the deep neck flexors. Signs and symptoms are consistent with left cervicogenic headache. He will benefit from skilled physical therapy addressing his spinal mobility, scapular strength, and proprioceptive retraining.    Patient prognosis is Good.   Patientt will benefit from skilled outpatient Physical Therapy to address the deficits stated above and in the chart below, provide patient /family education, and to maximize patientt's level of independence.     Plan of care discussed with patient: Yes  Patient's spiritual, cultural and educational needs considered and patient is agreeable to the plan of care and goals as stated below:     Anticipated Barriers for therapy: None    Medical Necessity is demonstrated by the following  History  Co-morbidities and personal factors that may impact the plan of care Co-morbidities:   None    Personal Factors:   no deficits     low   Examination  Body Structures and Functions, activity limitations and participation restrictions that may impact the plan of care Body Regions:   neck  trunk    Body Systems:    gross symmetry  strength  gross coordinated movement  motor control    Participation Restrictions:   None    Activity limitations:   Learning and applying knowledge  no deficits    General Tasks and Commands  no deficits    Communication  no deficits    Mobility  lifting and carrying objects    Self care  looking after one's health  Sleeping    Domestic Life  doing house work (cleaning house, washing dishes, laundry)      Interactions/Relationships  no deficits    Life Areas  no deficits    Community and Social Life  recreation and leisure         moderate   Clinical Presentation stable and uncomplicated low   Decision Making/ Complexity Score: low     Goals:  Short Term Goals: 3-4 weeks   Patient will have reduced pain complaints from 10/10 to less than or equal to 5/10. (Not Met - Progressing)  Patient will demonstrate increased muscle strength of at least  one-half grade as compared to the initial measurements. (Not Met - Progressing)    Long Term Goals: 6-8 weeks   Patient will have reduced pain complaints from 5/10 to less than or equal to 0/10. (Not Met - Progressing)  Patient will demonstrate an improved Activation Score of 30 mmHg without SCM activation on the Cranio-cervical Flexion Test. (Not Met - Progressing)  Patient will demonstrate an improved Performance Index of 30 mmHg before fatigue on the Cranio-cervical Flexion Test. (Not Met - Progressing)   Patient will demonstrate increased muscle strength of at least one grade as compared to the initial measurements. (Not Met - Progressing)  Patient will improve Neck FOTO limitation score from 69% to less than or equal to 40%. (Not Met - Progressing)  Patient will be independent with their home exercise program. (Not Met - Progressing)    PLAN   Plan of care Certification: 5/11/2023 to 8/3/2023.    Outpatient Physical Therapy 1 times weekly for 8 weeks to include the following interventions: Electrical Stimulation with dry needling, Manual Therapy, Neuromuscular Re-ed, Patient Education, Therapeutic Activities, and Therapeutic Exercise.     Jose Antonio Petersen, PT, DPT, OCS    Co-treated with Wild Harding PT  Board Certified in Orthopedic Physical Therapy  Fellow, [employer] Manual Physical Therapists

## 2023-05-15 ENCOUNTER — PATIENT MESSAGE (OUTPATIENT)
Dept: OTOLARYNGOLOGY | Facility: CLINIC | Age: 33
End: 2023-05-15
Payer: COMMERCIAL

## 2023-05-17 ENCOUNTER — OFFICE VISIT (OUTPATIENT)
Dept: OTOLARYNGOLOGY | Facility: CLINIC | Age: 33
End: 2023-05-17
Payer: COMMERCIAL

## 2023-05-17 DIAGNOSIS — J34.2 NASAL SEPTAL DEVIATION: ICD-10-CM

## 2023-05-17 DIAGNOSIS — J32.9 CHRONIC RECURRENT SINUSITIS: Primary | ICD-10-CM

## 2023-05-17 PROCEDURE — 99214 PR OFFICE/OUTPT VISIT, EST, LEVL IV, 30-39 MIN: ICD-10-PCS | Mod: 95,,, | Performed by: OTOLARYNGOLOGY

## 2023-05-17 PROCEDURE — 99214 OFFICE O/P EST MOD 30 MIN: CPT | Mod: 95,,, | Performed by: OTOLARYNGOLOGY

## 2023-05-17 NOTE — PROGRESS NOTES
The patient location is: work  The chief complaint leading to consultation is: CT results  Visit type: Virtual visit with synchronous audio and video  Total time spent with patient: 15 minutes  Each patient to whom he or she provides medical services by telemedicine is:  (1) informed of the relationship between the physician and patient and the respective role of any other health care provider with respect to management of the patient; and (2) notified that he or she may decline to receive medical services by telemedicine and may withdraw from such care at any time.     I have reviewed Mr. Mills's CT scans with him via telemedicine visit.  His scans show evidence of chronic recurrent sinus disease involving all of his paranasal sinuses as well as a posterior septal spur on the right side abutting his lateral wall.  We discussed [employer] Fess addressing all of his sinuses along with endoscopic septoplasty.  I have discussed the pros and cons risks and benefits as well as technical aspects of this procedure in detail with him.  He understands and accepts these and wishes to proceed with scheduling.  We will set this up for him at his convenience.

## 2023-05-25 DIAGNOSIS — Z01.818 PRE-OP TESTING: Primary | ICD-10-CM

## 2023-05-25 DIAGNOSIS — J32.9 CHRONIC RECURRENT SINUSITIS: Primary | ICD-10-CM

## 2023-05-25 DIAGNOSIS — J34.2 NASAL SEPTAL DEVIATION: ICD-10-CM

## 2023-05-30 ENCOUNTER — PATIENT MESSAGE (OUTPATIENT)
Dept: OTOLARYNGOLOGY | Facility: CLINIC | Age: 33
End: 2023-05-30
Payer: COMMERCIAL

## 2023-06-27 ENCOUNTER — PATIENT MESSAGE (OUTPATIENT)
Dept: SURGERY | Facility: HOSPITAL | Age: 33
End: 2023-06-27
Payer: COMMERCIAL

## 2023-06-29 ENCOUNTER — ANESTHESIA EVENT (OUTPATIENT)
Dept: SURGERY | Facility: HOSPITAL | Age: 33
End: 2023-06-29
Payer: COMMERCIAL

## 2023-06-29 NOTE — PRE-PROCEDURE INSTRUCTIONS
Following instructions given via phone:    On the day of surgery please report to [facility] located at [address].  Please park in the lot in front of the building and enter at the main entrance. Proceed to the second floor for registration.    Arrival time: 0530    You may not drive home after your procedure. You may not leave alone in an uber, taxi, etc.  You must be discharged to a responsible adult. You must remain under adult supervision for 24 hours.   If possible, please have your visitor &/or ride home stay during your visit.   The surgeon should speak with your visitors after your procedure.  All visitors must be 18 years of age or older. Please limit your visitors to max of 2 people.    No food, no drink after midnight.    Take the following medications the morning of your procedure: see med list, take with water      If applicable, do not shave the surgical site 5 days prior to your procedure.  Shower the night before and the morning of your procedure with antibacterial soap.  Do not apply any products to your skin nor your hair after you shower the morning of your procedure.  No lotion, no oils, no powders,  Wear loose, comfortable clothing.  No jewelry. No contacts. Bring glasses if necessary.  If you have dentures, bring a case.  Leave all valuables at home.

## 2023-06-29 NOTE — ANESTHESIA PREPROCEDURE EVALUATION
06/29/2023  Angelito Mills is a 32 y.o., male here for FESS      Pre-op Assessment    I have reviewed the Patient Summary Reports.       I have reviewed the Medications.     Review of Systems  Anesthesia Hx:  No previous Anesthesia    Hematology/Oncology:  Hematology Normal   Oncology Normal     EENT/Dental:EENT/Dental Normal   Cardiovascular:  Cardiovascular Normal     Pulmonary:   Recent URI    Renal/:  Renal/ Normal     Hepatic/GI:  Hepatic/GI Normal    Musculoskeletal:  Musculoskeletal Normal    Neurological:   Headaches    Endocrine:  Endocrine Normal    Dermatological:  Skin Normal    Psych:   Psychiatric History        Patient Active Problem List   Diagnosis    ADD (attention deficit disorder)    CHRISTINE (generalized anxiety disorder)    Cervicogenic headache     Past Medical History:   Diagnosis Date    ADD (attention deficit disorder)     Allergy     Recurrent upper respiratory infection (URI)        History reviewed. No pertinent surgical history.       Anesthesia Plan  Type of Anesthesia, risks & benefits discussed:    Anesthesia Type: Gen ETT  Intra-op Monitoring Plan: Standard ASA Monitors  Induction:  IV  Informed Consent: Informed consent signed with the Patient and all parties understand the risks and agree with anesthesia plan.  All questions answered.   ASA Score: 2    Ready For Surgery From Anesthesia Perspective.     .

## 2023-06-30 ENCOUNTER — HOSPITAL ENCOUNTER (OUTPATIENT)
Facility: HOSPITAL | Age: 33
Discharge: HOME | End: 2023-06-30
Attending: OTOLARYNGOLOGY | Admitting: OTOLARYNGOLOGY
Payer: COMMERCIAL

## 2023-06-30 ENCOUNTER — ANESTHESIA (OUTPATIENT)
Dept: SURGERY | Facility: HOSPITAL | Age: 33
End: 2023-06-30
Payer: COMMERCIAL

## 2023-06-30 VITALS
OXYGEN SATURATION: 98 % | TEMPERATURE: 98 F | SYSTOLIC BLOOD PRESSURE: 124 MMHG | DIASTOLIC BLOOD PRESSURE: 78 MMHG | RESPIRATION RATE: 14 BRPM | HEART RATE: 71 BPM

## 2023-06-30 DIAGNOSIS — J34.2 NASAL SEPTAL DEVIATION: ICD-10-CM

## 2023-06-30 DIAGNOSIS — J32.9 CHRONIC RECURRENT SINUSITIS: Primary | ICD-10-CM

## 2023-06-30 PROCEDURE — D9220A PRA ANESTHESIA: ICD-10-PCS | Mod: ,,, | Performed by: NURSE ANESTHETIST, CERTIFIED REGISTERED

## 2023-06-30 PROCEDURE — 61782 PR STEREOTACTIC COMP ASSIST PROC,CRANIAL,EXTRADURAL: ICD-10-PCS | Mod: ,,, | Performed by: OTOLARYNGOLOGY

## 2023-06-30 PROCEDURE — 31253 NSL/SINS NDSC TOTAL: CPT | Mod: 50,,, | Performed by: OTOLARYNGOLOGY

## 2023-06-30 PROCEDURE — 27000221 HC OXYGEN, UP TO 24 HOURS

## 2023-06-30 PROCEDURE — 25000003 PHARM REV CODE 250: Performed by: NURSE ANESTHETIST, CERTIFIED REGISTERED

## 2023-06-30 PROCEDURE — 36000711: Performed by: OTOLARYNGOLOGY

## 2023-06-30 PROCEDURE — 61782 SCAN PROC CRANIAL EXTRA: CPT | Mod: ,,, | Performed by: OTOLARYNGOLOGY

## 2023-06-30 PROCEDURE — 30520 PR REPAIR, NASAL SEPTUM: ICD-10-PCS | Mod: 51,,, | Performed by: OTOLARYNGOLOGY

## 2023-06-30 PROCEDURE — 71000015 HC POSTOP RECOV 1ST HR: Performed by: OTOLARYNGOLOGY

## 2023-06-30 PROCEDURE — C2625 STENT, NON-COR, TEM W/DEL SY: HCPCS | Performed by: OTOLARYNGOLOGY

## 2023-06-30 PROCEDURE — 71000033 HC RECOVERY, INTIAL HOUR: Performed by: OTOLARYNGOLOGY

## 2023-06-30 PROCEDURE — 88305 TISSUE EXAM BY PATHOLOGIST: ICD-10-PCS | Mod: 26,,, | Performed by: PATHOLOGY

## 2023-06-30 PROCEDURE — 37000009 HC ANESTHESIA EA ADD 15 MINS: Performed by: OTOLARYNGOLOGY

## 2023-06-30 PROCEDURE — 30140 PR EXCISION TURBINATE,SUBMUCOUS: ICD-10-PCS | Mod: 50,51,, | Performed by: OTOLARYNGOLOGY

## 2023-06-30 PROCEDURE — 37000008 HC ANESTHESIA 1ST 15 MINUTES: Performed by: OTOLARYNGOLOGY

## 2023-06-30 PROCEDURE — 25000003 PHARM REV CODE 250: Performed by: OTOLARYNGOLOGY

## 2023-06-30 PROCEDURE — 88305 TISSUE EXAM BY PATHOLOGIST: CPT | Performed by: PATHOLOGY

## 2023-06-30 PROCEDURE — 94761 N-INVAS EAR/PLS OXIMETRY MLT: CPT

## 2023-06-30 PROCEDURE — 31267 PR NASAL/SINUS ENDOSCOPY,RMV TISS MAXILL SINUS: ICD-10-PCS | Mod: 50,51,, | Performed by: OTOLARYNGOLOGY

## 2023-06-30 PROCEDURE — 63600175 PHARM REV CODE 636 W HCPCS: Performed by: ANESTHESIOLOGY

## 2023-06-30 PROCEDURE — 88305 TISSUE EXAM BY PATHOLOGIST: CPT | Mod: 26,,, | Performed by: PATHOLOGY

## 2023-06-30 PROCEDURE — 30140 RESECT INFERIOR TURBINATE: CPT | Mod: 50,51,, | Performed by: OTOLARYNGOLOGY

## 2023-06-30 PROCEDURE — 31267 ENDOSCOPY MAXILLARY SINUS: CPT | Mod: 50,51,, | Performed by: OTOLARYNGOLOGY

## 2023-06-30 PROCEDURE — 27201423 OPTIME MED/SURG SUP & DEVICES STERILE SUPPLY: Performed by: OTOLARYNGOLOGY

## 2023-06-30 PROCEDURE — 30520 REPAIR OF NASAL SEPTUM: CPT | Mod: 51,,, | Performed by: OTOLARYNGOLOGY

## 2023-06-30 PROCEDURE — 36000710: Performed by: OTOLARYNGOLOGY

## 2023-06-30 PROCEDURE — 63600175 PHARM REV CODE 636 W HCPCS: Performed by: OTOLARYNGOLOGY

## 2023-06-30 PROCEDURE — 25000003 PHARM REV CODE 250: Performed by: ANESTHESIOLOGY

## 2023-06-30 PROCEDURE — D9220A PRA ANESTHESIA: Mod: ,,, | Performed by: NURSE ANESTHETIST, CERTIFIED REGISTERED

## 2023-06-30 PROCEDURE — 31253 PR ENDOSCOPY, NASAL/SINUS, W/ETHMOIDECTOMY: ICD-10-PCS | Mod: 50,,, | Performed by: OTOLARYNGOLOGY

## 2023-06-30 PROCEDURE — 63600175 PHARM REV CODE 636 W HCPCS: Performed by: NURSE ANESTHETIST, CERTIFIED REGISTERED

## 2023-06-30 DEVICE — IMPLANT PROPEL MOMETASONE: Type: IMPLANTABLE DEVICE | Site: SINUS | Status: FUNCTIONAL

## 2023-06-30 RX ORDER — KETAMINE HCL IN 0.9 % NACL 50 MG/5 ML
SYRINGE (ML) INTRAVENOUS
Status: DISCONTINUED | OUTPATIENT
Start: 2023-06-30 | End: 2023-06-30

## 2023-06-30 RX ORDER — HYDROCODONE BITARTRATE AND ACETAMINOPHEN 5; 325 MG/1; MG/1
1 TABLET ORAL EVERY 6 HOURS PRN
Qty: 15 TABLET | Refills: 0 | Status: SHIPPED | OUTPATIENT
Start: 2023-06-30 | End: 2023-07-03 | Stop reason: SDUPTHER

## 2023-06-30 RX ORDER — BUPIVACAINE HYDROCHLORIDE AND EPINEPHRINE 5; 5 MG/ML; UG/ML
INJECTION, SOLUTION EPIDURAL; INTRACAUDAL; PERINEURAL
Status: DISCONTINUED | OUTPATIENT
Start: 2023-06-30 | End: 2023-06-30 | Stop reason: HOSPADM

## 2023-06-30 RX ORDER — EPINEPHRINE 0.1 MG/ML
INJECTION INTRAVENOUS
Status: DISCONTINUED | OUTPATIENT
Start: 2023-06-30 | End: 2023-06-30 | Stop reason: HOSPADM

## 2023-06-30 RX ORDER — OXYMETAZOLINE HCL 0.05 %
2 SPRAY, NON-AEROSOL (ML) NASAL DAILY PRN
Qty: 30 ML | Refills: 0 | Status: SHIPPED | OUTPATIENT
Start: 2023-06-30 | End: 2023-07-03

## 2023-06-30 RX ORDER — MIDAZOLAM HYDROCHLORIDE 1 MG/ML
INJECTION INTRAMUSCULAR; INTRAVENOUS
Status: DISCONTINUED | OUTPATIENT
Start: 2023-06-30 | End: 2023-06-30

## 2023-06-30 RX ORDER — ONDANSETRON 2 MG/ML
4 INJECTION INTRAMUSCULAR; INTRAVENOUS DAILY PRN
Status: DISCONTINUED | OUTPATIENT
Start: 2023-06-30 | End: 2023-06-30 | Stop reason: HOSPADM

## 2023-06-30 RX ORDER — OXYMETAZOLINE HCL 0.05 %
SPRAY, NON-AEROSOL (ML) NASAL
Status: DISCONTINUED | OUTPATIENT
Start: 2023-06-30 | End: 2023-06-30 | Stop reason: HOSPADM

## 2023-06-30 RX ORDER — OXYCODONE HYDROCHLORIDE 5 MG/1
5 TABLET ORAL
Status: DISCONTINUED | OUTPATIENT
Start: 2023-06-30 | End: 2023-06-30 | Stop reason: HOSPADM

## 2023-06-30 RX ORDER — HYDROMORPHONE HYDROCHLORIDE 1 MG/ML
0.5 INJECTION, SOLUTION INTRAMUSCULAR; INTRAVENOUS; SUBCUTANEOUS EVERY 5 MIN PRN
Status: DISCONTINUED | OUTPATIENT
Start: 2023-06-30 | End: 2023-06-30 | Stop reason: HOSPADM

## 2023-06-30 RX ORDER — ONDANSETRON 4 MG/1
4 TABLET, FILM COATED ORAL EVERY 6 HOURS PRN
Qty: 15 TABLET | Refills: 0 | Status: SHIPPED | OUTPATIENT
Start: 2023-06-30 | End: 2023-08-31 | Stop reason: SDUPTHER

## 2023-06-30 RX ORDER — DEXAMETHASONE SODIUM PHOSPHATE 4 MG/ML
INJECTION, SOLUTION INTRA-ARTICULAR; INTRALESIONAL; INTRAMUSCULAR; INTRAVENOUS; SOFT TISSUE
Status: DISCONTINUED | OUTPATIENT
Start: 2023-06-30 | End: 2023-06-30

## 2023-06-30 RX ORDER — FENTANYL CITRATE 50 UG/ML
INJECTION, SOLUTION INTRAMUSCULAR; INTRAVENOUS
Status: DISCONTINUED | OUTPATIENT
Start: 2023-06-30 | End: 2023-06-30

## 2023-06-30 RX ORDER — ROCURONIUM BROMIDE 10 MG/ML
INJECTION, SOLUTION INTRAVENOUS
Status: DISCONTINUED | OUTPATIENT
Start: 2023-06-30 | End: 2023-06-30

## 2023-06-30 RX ORDER — LIDOCAINE HYDROCHLORIDE 20 MG/ML
INJECTION INTRAVENOUS
Status: DISCONTINUED | OUTPATIENT
Start: 2023-06-30 | End: 2023-06-30

## 2023-06-30 RX ORDER — SODIUM CHLORIDE 0.9 % (FLUSH) 0.9 %
10 SYRINGE (ML) INJECTION
Status: ACTIVE | OUTPATIENT
Start: 2023-06-30

## 2023-06-30 RX ORDER — SODIUM CHLORIDE 9 MG/ML
INJECTION, SOLUTION INTRAVENOUS CONTINUOUS PRN
Status: DISCONTINUED | OUTPATIENT
Start: 2023-06-30 | End: 2023-06-30

## 2023-06-30 RX ORDER — ACETAMINOPHEN 10 MG/ML
INJECTION, SOLUTION INTRAVENOUS
Status: DISCONTINUED | OUTPATIENT
Start: 2023-06-30 | End: 2023-06-30

## 2023-06-30 RX ORDER — LIDOCAINE HYDROCHLORIDE AND EPINEPHRINE 10; 10 MG/ML; UG/ML
INJECTION, SOLUTION INFILTRATION; PERINEURAL
Status: DISCONTINUED | OUTPATIENT
Start: 2023-06-30 | End: 2023-06-30 | Stop reason: HOSPADM

## 2023-06-30 RX ORDER — PROPOFOL 10 MG/ML
VIAL (ML) INTRAVENOUS
Status: DISCONTINUED | OUTPATIENT
Start: 2023-06-30 | End: 2023-06-30

## 2023-06-30 RX ORDER — BACITRACIN ZINC 500 UNIT/G
OINTMENT (GRAM) TOPICAL
Status: DISCONTINUED | OUTPATIENT
Start: 2023-06-30 | End: 2023-06-30 | Stop reason: HOSPADM

## 2023-06-30 RX ORDER — CEFAZOLIN SODIUM 1 G/3ML
INJECTION, POWDER, FOR SOLUTION INTRAMUSCULAR; INTRAVENOUS
Status: DISCONTINUED | OUTPATIENT
Start: 2023-06-30 | End: 2023-06-30

## 2023-06-30 RX ORDER — ONDANSETRON 2 MG/ML
INJECTION INTRAMUSCULAR; INTRAVENOUS
Status: DISCONTINUED | OUTPATIENT
Start: 2023-06-30 | End: 2023-06-30

## 2023-06-30 RX ADMIN — SODIUM CHLORIDE: 0.9 INJECTION, SOLUTION INTRAVENOUS at 07:06

## 2023-06-30 RX ADMIN — SUGAMMADEX 200 MG: 100 INJECTION, SOLUTION INTRAVENOUS at 09:06

## 2023-06-30 RX ADMIN — Medication 10 MG: at 08:06

## 2023-06-30 RX ADMIN — ROCURONIUM BROMIDE 50 MG: 10 INJECTION, SOLUTION INTRAVENOUS at 07:06

## 2023-06-30 RX ADMIN — ONDANSETRON 4 MG: 2 INJECTION INTRAMUSCULAR; INTRAVENOUS at 10:06

## 2023-06-30 RX ADMIN — Medication 20 MG: at 08:06

## 2023-06-30 RX ADMIN — OXYCODONE 5 MG: 5 TABLET ORAL at 10:06

## 2023-06-30 RX ADMIN — ACETAMINOPHEN 1000 MG: 10 INJECTION INTRAVENOUS at 08:06

## 2023-06-30 RX ADMIN — MIDAZOLAM HYDROCHLORIDE 2 MG: 1 INJECTION INTRAMUSCULAR; INTRAVENOUS at 07:06

## 2023-06-30 RX ADMIN — LIDOCAINE HYDROCHLORIDE 100 MG: 20 INJECTION INTRAVENOUS at 07:06

## 2023-06-30 RX ADMIN — ONDANSETRON 4 MG: 2 INJECTION INTRAMUSCULAR; INTRAVENOUS at 07:06

## 2023-06-30 RX ADMIN — CEFAZOLIN 2 G: 330 INJECTION, POWDER, FOR SOLUTION INTRAMUSCULAR; INTRAVENOUS at 07:06

## 2023-06-30 RX ADMIN — FENTANYL CITRATE 50 MCG: 50 INJECTION, SOLUTION INTRAMUSCULAR; INTRAVENOUS at 07:06

## 2023-06-30 RX ADMIN — PROPOFOL 200 MG: 10 INJECTION, EMULSION INTRAVENOUS at 07:06

## 2023-06-30 RX ADMIN — DEXAMETHASONE SODIUM PHOSPHATE 8 MG: 4 INJECTION, SOLUTION INTRAMUSCULAR; INTRAVENOUS at 07:06

## 2023-06-30 NOTE — ANESTHESIA POSTPROCEDURE EVALUATION
Anesthesia Post Evaluation    Patient: Angelito Mills    Procedure(s) Performed: Procedure(s) (LRB):  FESS, USING COMPUTER-ASSISTED NAVIGATION (Bilateral)  SEPTOPLASTY, NOSE, ENDOSCOPIC (Bilateral)  EXCISION, NASAL TURBINATE, SUBMUCOSAL (Bilateral)    Final Anesthesia Type: general      Patient location during evaluation: PACU  Patient participation: Yes- Able to Participate  Level of consciousness: awake and alert  Post-procedure vital signs: reviewed and stable  Pain management: adequate  Airway patency: patent    PONV status at discharge: No PONV  Anesthetic complications: no      Cardiovascular status: blood pressure returned to baseline  Respiratory status: unassisted  Hydration status: euvolemic            Vitals Value Taken Time   /78 06/30/23 1101   Temp 36.7 °C (98.1 °F) 06/30/23 0935   Pulse 71 06/30/23 1100   Resp 14 06/30/23 1100   SpO2 98 % 06/30/23 1100         Event Time   Out of Recovery 10:15:00         Pain/Michael Score: Pain Rating Prior to Med Admin: 5 (6/30/2023 10:19 AM)  Michael Score: 10 (6/30/2023 10:30 AM)

## 2023-06-30 NOTE — PATIENT INSTRUCTIONS
Use salt water spray over the nasal packing every 4 hours to prevent drying/crusting.  If having to change out the moustache dressing often because of bleeding, can also use oxymetazoline (Afrin) nasal spray over the nasal packing three times a day to slow down bleeding.    DO NOT CALL [facility] ON CALL FOR POSTOPERATIVE PROBLEMS. CALL THE  [phone] AND ASK FOR ENT ON CALL.

## 2023-06-30 NOTE — TRANSFER OF CARE
Anesthesia Transfer of Care Note    Patient: Angelito Mills    Procedure(s) Performed: Procedure(s) (LRB):  FESS, USING COMPUTER-ASSISTED NAVIGATION (Bilateral)  SEPTOPLASTY, NOSE, ENDOSCOPIC (Bilateral)  EXCISION, NASAL TURBINATE, SUBMUCOSAL (Bilateral)    Patient location: PACU    Anesthesia Type: general    Transport from OR: Transported from OR on 6-10 L/min O2 by face mask with adequate spontaneous ventilation    Post pain: adequate analgesia    Post assessment: no apparent anesthetic complications and tolerated procedure well    Post vital signs: stable    Level of consciousness: responds to stimulation and sedated    Nausea/Vomiting: no nausea/vomiting    Complications: none    Transfer of care protocol was followed      Last vitals:   Visit Vitals  /83 (BP Location: Left arm, Patient Position: Lying)   Pulse 73   Temp 37.2 °C (98.9 °F) (Tympanic)   Resp 17   SpO2 98%

## 2023-06-30 NOTE — DISCHARGE INSTRUCTIONS
Please call the clinic [phone] (or after hours [phone]) immediately if you:  Experience a post-op fever 101 or greater. If you have a temp of  degrees, this is the body's normal reaction to surgery/anesthesia. If you are concerned you may have a fever, please check your temp before taking Tylenol.   Any redness spreading away from the incision site.   Any unusual, painful swelling near incisions.   Any active bleeding that saturates more than a 4X4 guaze.   Any changes in vision or swelling around the eye.  Any purulent (pus) drainage (green or yellow in color)    What to expect the first few days after surgery:  It is normal to have a lot of nasal drainage that is clear and bloody. Gently dab your nose. Avoid excessive sniffling. Let all drainage fall on your mustache dressing small piece of gauze rolled under your nose, secured with tape). If you have packing in your nose, it is secured. DO NOT remove it or try to maneuver the packing. It is normal for the packing to become bloody. This packing cannot be removed before your first post op appointment. It is normal for your nose to feel numb. This will get better over the next few weeks. If you have an external splint on your nose, do not let it get wet or remove it. This will be removed by the surgeon at your 1st post op appointment.    Helpful tips:  Use a cool mist humidifier by your bed.     Post op Activity:   Sleep on your back, elevated with 2-3 pillows for first 2 weeks. Wear HORACIO hose for 2 days following surgery or until you are walking around your home. No flying for 2 weeks. No sexual activity for two weeks. No heavy lifting (greater than 10lbs) for 3 weeks. No strenuous activity or bending for long periods of time for 3 weeks. No blowing nose for 3 weeks. Sneeze with your mouth open. No swimming for 4-6weeks. No operating a motor vehicle/heavy machinery until external splint has been removed and until patient is no longer taking pain  medication. Do not smoke or be around smokers. Avoid exposure to cold and allergic triggers.     Dressings:   Change the mustache dressing as needed. You will most likely have pink or red nasal drainage for 2-3 days. If there is any packing in the nose, it may change from white to red from the nasal drainage. Do not touch or pull the packing. It will not come out. It will be removed by the surgeon at the first post op appointment. Call the clinic with any questions. If packing becomes dry/hard, patient may drip ocean spray/saline spray on the packing to moisten as needed. This usually helps decrease nasal pain and makes it easier to remove on the day of the post-op appointment. Apply cold compresses to your cheeks and eyes for 20mins every 2 hrs for the first 2-3 days or however you can tolerate. This will aid in minimizing swelling and bruising.     You will be seen in 5-6 days post op then 3 weeks after this appointment.     Over the counter meds you can use after surgery include a stool softener such as colace if no bowel movement in 3-5days. Miralax or senokot if no bowel movement after starting colace. Ocean spray/saline spray (this is a must have). Bacitracin ointment to apply herberth edge of both nostrils with a Q-tip. Do not insert more than half the cotton tip. Afrin - only use for severe nasal congestion or bleeding. Use 2 times a day for 3 days, stop for 1 day, continue 2 times per day for 3 days, then stop completely.     Post op diet - Begin with bland foods for the day after surgery then advance to a regular diet as tolerated. Smoothies, ensure, protein drinks, etc are great if you have packing in your nose. Drink plenty of fluids.   AVOID - Hot and spicy foods including mexican, chinese, and seafood for at least 2 weeks. No salty soups for 1 weeks after surger because it increases swelling and blood pressure. No hard/chewy foods for 2 weeks. Avoid alcoholic and caffeinated beverages for 2 weeks.

## 2023-06-30 NOTE — H&P
To OR for endoscopic sinus surgery, septoplasty, inferior turbinate reduction.    Vitals:    23 1141   BP: 120/78   Pulse: 78       Chief Complaint:  Recurrent sinusitis     HPI:  Mr. Mills is a 32-year-old white male who presents with complaints of sinus pressure pain and congestion.  He states that he gets sinus infections at least 2-3 times per year requiring antibiotics.  He is currently on a Z-Oliver now.  His symptoms include facial pressure pain and purulent nasal discharge.  He denies any previous nasal trauma or surgeries.  He has been using a syringe type nasal irrigation 2-3 times weekly though he is using tap water.  He had seen an allergist in the past and is on Zyrtec, Singulair and Flonase.    SNOT22- 40 NOSE- 50    Review of Systems:  Constitutional:   weight loss or weight gain: Negative  Allergy/Immunologic:   Positive  Nasal Congestion/Obstruction:   Positive for nasal congestion  Nosebleeds:   Negative  Sinus infections:   Positive as above  Headache/Facial Pain:   Positive for facial pressure pain and headaches.  Snoring/GRANT:   Negative  Throat: Infections/Pain:   Negative  Hoarseness/Speech Disturbance:   Negative  Trauma Hx:  Negative    Cardiovascular:  M/I Angina: Negative  Hypertension: Negative  Endocrine:    DM/Steroids: Negative  GI:   Dysphagia/Reflux: Negative  :   GYN Pregnancy: Negative  Renal:   Dialysis: Negative  Lymphatic:   Neck Mass/Lymphadenopathy: Negative  Muscoloskeletal:   Negative  Hematologic:   Bleeding Disorders/Anemia: Negative  Neurologic:    Cranial/Neuralgia: Negative  Pulmonary:   Asthma/SOB/Cough: Negative  Skin Disorders: Negative    Past Medical/Surgical/Family/Social History:    ENT Surgery: Negative  Occupational Exposure: Negative   Problems: Negative  Cancer: Negative    Past Family History:   Family history of Cancer: Negative    Past Social History:   Tobacco: Nonsmoker   Alcohol: Social Drinker      Allergies and medications: Reviewed per  med card.    Physical Examination:  Ears:   External auditory canals:  Clear   Hearing: Grossly intact   Tympanic Membranes: Clear  Nose:   External: Normal   Intranasal:  Septal spur to the left with 2+ turbinates which do decongest somewhat.  Mouth:   Intraorally: Lips, teeth, and gums: Normal   Oropharynx: Normal   Mucosa: Normal   Tongue: Normal  Throat:      Palate: Normal palate with elevation   Tonsils:  1+ bilaterally   Posterior Pharynx: Normal  Fiberoptic exam: Not performed  Head/Face:     Inspection: Normal and atraumatic   Palpation/Percussion:  Tender to percussion in the ethmoid regions bilaterally.   Facial strength: Normal and symmetric   Salivary glands: Normal  Neck: Supple  Thyroid: No masses  Lymphatics: No nodes  Respiratory:   Effort: Normal  Eyes:   Ocular Mobility: Normal   Vision: Grossly intact  Neuro/Psych:   Cranial Nerves: Grossly Intact   Orientation: Normal   Mood/Affect: Normal      Assessment/Plan:  I have discussed my findings with him in detail as well as my recommendations for treatment.  I have given him literature on saline sinus rinses including its use with distilled water only described how this to be used in detail with him.  He will do this daily.  I have also recommended a trial of Nasacort nasal spray and I have described how this is to be administered as well.  I will order a [employer] (formerly [employer]) CT scan of his sinuses to evaluate these.  He will contact us after this is completed to arrange for follow-up.

## 2023-06-30 NOTE — ANESTHESIA PROCEDURE NOTES
Intubation    Date/Time: 6/30/2023 7:42 AM  Performed by: Rekha Clinton CRNA  Authorized by: Keyur Judge MD     Intubation:     Induction:  Intravenous    Intubated:  Postinduction    Mask Ventilation:  Easy mask    Attempts:  1    Attempted By:  CRNA    Method of Intubation:  Video laryngoscopy    Blade:  Rubin 3    Laryngeal View Grade: Grade I - full view of cords      Difficult Airway Encountered?: No      Complications:  None    Airway Device:  Oral mercedez    Airway Device Size:  7.5    Style/Cuff Inflation:  Cuffed (inflated to minimal occlusive pressure)    Tube secured:  22    Secured at:  The lips    Placement Verified By:  Capnometry    Complicating Factors:  None    Findings Post-Intubation:  BS equal bilateral and atraumatic/condition of teeth unchanged

## 2023-06-30 NOTE — OP NOTE
DATE OF PROCEDURE: 06/30/2023    SURGEON: Yohannes Cunningham III, M.D.      ASSISTANT SURGEON: Armando Jackson, PGY3     PRE-OPERATIVE DIAGNOSIS:  1. Chronic rhinosinusitis.  2. Deviated nasal septum.  3. Inferior turbinate hypertrophy.     POST-OPERATIVE DIAGNOSIS:  1. Chronic rhinosinusitis.  2. Deviated nasal septum.  3. Inferior turbinate hypertrophy.     PROCEDURES PERFORMED:   1. Medtronic FESS. CPT 92557  2. Bilateral maxillary antrostomy with removal of polypoid contents. CPT 09354-36  3. Bilateral total ethmoidectomy and frontal sinus exploration with removal of polypoid contents. CPT 72903-20  4. Bilateral sphenoidotomy and removal of polypoid contents. CPT 22396-77  5. Septoplasty CPT 02939  6. Bilateral submucosal reduction of turbinates CPT 84495-43     ANESTHESIA: General     SPECIMENS: None     ESTIMATED BLOOD LOSS: 15 ml      COMPLICATIONS: None apparent    PROCEDURE IN DETAIL:   After the appropriate consents were obtained, the patient was brought to the Operating Room. He was positioned supine on the operating room table. After successful endotracheal intubation and general anesthesia was initiated, the patient was then rotated 180 degrees away from anesthesia. The patient was then prepped and draped in the usual fashion. A complete timeout was then held with the surgical nursing and Anesthesia teams.    Attention was first turned towards the septoplasty. The Nickie was used to make a vertical incision was made along the right posterior bony septum just anterior to the septal spur. A mucoperiosteal flap was raised behind the bony spur. The Monroe was used to stipple the bony septum and a mucoperiosteal flap was raised on the other side. The Abhay was used to remove the bony spur en bloc. The mucoperiosteal flap was flattened.      Next, attention was turned towards the functional endoscopic sinus surgery. The right nasal cavity was examined with the 0-degree endoscope, the middle meatus was  identified and the middle turbinate was medialized. The ethmoid bulla was identified and entered with a straight suction under stealth image guidance. The ethmoid contents were then removed with straight Blakesley forceps. Further debridement of the ethmoid cells was then performed using a powered microdebrider. This was carried to the face of the sphenoid sinus. The frontal ethmoid recess was explored using curved suction under stealth image guidance. Contents were then removed from the frontal sinus using upbiting forceps. The natural maxillary ostia was then identified. This was then entered with a curved suction under stealth image guidance. Contents were then removed from the maxillary sinus using a straight Blakesley forceps and backbiter. Maxillary ostia was then widened using powered microdebrider.    Attention was then turned towards the left nasal cavity. In a similar fashion, the left ethmoid bulla was identified and entered with straight suction. The contents were then removed with straight Blakesley forceps and further debridement was carried out with the powered microdebrider. The frontal ethmoid recess was explored using curved suction under stealth image guidance. Contents were then removed from the Frontal sinus using upbiting forceps. Again, the maxillary ostia was identified and entered with a curved suction. The maxillary contents further removed with straight Blakesley forceps. The natural maxillary ostia was then widened using a powered microdebrider.    Lastly, bilateral submucosal reduction of turbinates were performed. This was performed on the right hand side using a powered microdebrider with turbinate blade. Outfracturing was then performed with a Goldmann elevator. This was similarly performed on the left hand side with a powered microdebrided with inferior turbinate blade as well as outfracturing with Goldmann elevator.    Next, drug-eluting stents were placed in the bilateral ethmoid  cavities. A piece of Surgicel was applied over the mucoperiosteal flap incision site on the right bony septum. The bilateral nasal cavities was filled with Chitogel. The stomach was suctioned with an orogastric tube. At this point, the patient was turned back towards Anesthesia. He was successfully awakened in the Operating Room.     The patient was then transferred to Postanesthesia Care Unit in stable condition.    Dr. Cunningham was present and participated in the entirety of the case.

## 2023-06-30 NOTE — PLAN OF CARE
0935 - Pt arrived to PACU bay 17 from OR via stretcher w/ OR team. SpO2 100% on 8L O2 via simple mask. OPA in place. Respirations even and unlabored. Pt sedated and unresponsive at this time. Moustache dressing in place. VSS, NAD. Bed locked in lowest position w/ SR up x 2.    1045 - Discharge instructions given and explained to patient and family with verbalization of understanding all instructions. Prescription given and explained next time and doses of each medication. Patients v/s stable, denies n/v and tolerating po, rates pain level tolerable, IV removed, and family at bedside for patient discharge home.

## 2023-06-30 NOTE — BRIEF OP NOTE
[facility] ([facility])  Brief Operative Note    Surgery Date: 6/30/2023     Surgeon(s) and Role:     * Yohannes Cunningham III, MD - Primary    Assisting Surgeon: None    Pre-op Diagnosis:  Chronic recurrent sinusitis [J32.9]  Nasal septal deviation [J34.2]    Post-op Diagnosis:  Post-Op Diagnosis Codes:     * Chronic recurrent sinusitis [J32.9]     * Nasal septal deviation [J34.2]    Procedure(s) (LRB):  FESS, USING COMPUTER-ASSISTED NAVIGATION (Bilateral)  SEPTOPLASTY, NOSE (Bilateral)    Anesthesia: General    Operative Findings: endoscopic septoplasty, FESS, ITR    Estimated Blood Loss: 10 ml         Specimens:   Specimen (24h ago, onward)       Start     Ordered    06/30/23 0911  Specimen to Pathology, Surgery ENT  Once        Comments: Pre-op Diagnosis: Chronic recurrent sinusitis [J32.9]Nasal septal deviation [J34.2]Procedure(s):FESS, USING COMPUTER-ASSISTED NAVIGATIONSEPTOPLASTY, NOSE Number of specimens: 6Name of specimens: 1- Right ethmoid sinus, 2- Right frontal sinus, 3- Right maxillary sinus, 4- Left ethmoid sinus, 5- Left frontal sinus, 6- Left maxillary sinus     References:    Click here for ordering Quick Tip   Question Answer Comment   Procedure Type: ENT    Which provider would you like to cc? YOHANNES CUNNINGHAM III    Release to patient Immediate        06/30/23 0914                      Discharge Note    OUTCOME: Patient tolerated treatment/procedure well without complication and is now ready for discharge.    DISPOSITION: Home or Self Care    FINAL DIAGNOSIS:  Chronic recurrent sinusitis    FOLLOWUP: In clinic    DISCHARGE INSTRUCTIONS:    Discharge Procedure Orders   Diet Adult Regular     No dressing needed     Activity as tolerated

## 2023-07-02 ENCOUNTER — PATIENT MESSAGE (OUTPATIENT)
Dept: OTOLARYNGOLOGY | Facility: CLINIC | Age: 33
End: 2023-07-02
Payer: COMMERCIAL

## 2023-07-03 ENCOUNTER — PATIENT MESSAGE (OUTPATIENT)
Dept: OTOLARYNGOLOGY | Facility: CLINIC | Age: 33
End: 2023-07-03
Payer: COMMERCIAL

## 2023-07-03 DIAGNOSIS — Z98.890 S/P NASAL SEPTOPLASTY: Primary | ICD-10-CM

## 2023-07-03 DIAGNOSIS — J34.2 NASAL SEPTAL DEVIATION: ICD-10-CM

## 2023-07-03 RX ORDER — HYDROCODONE BITARTRATE AND ACETAMINOPHEN 5; 325 MG/1; MG/1
1 TABLET ORAL EVERY 6 HOURS PRN
Qty: 15 TABLET | Refills: 0 | Status: SHIPPED | OUTPATIENT
Start: 2023-07-03 | End: 2023-07-03

## 2023-07-03 RX ORDER — HYDROCODONE BITARTRATE AND ACETAMINOPHEN 5; 325 MG/1; MG/1
1 TABLET ORAL EVERY 6 HOURS PRN
Qty: 15 TABLET | Refills: 0 | Status: SHIPPED | OUTPATIENT
Start: 2023-07-03 | End: 2023-08-31

## 2023-07-07 LAB
FINAL PATHOLOGIC DIAGNOSIS: NORMAL
GROSS: NORMAL
Lab: NORMAL

## 2023-07-11 ENCOUNTER — OFFICE VISIT (OUTPATIENT)
Dept: OTOLARYNGOLOGY | Facility: CLINIC | Age: 33
End: 2023-07-11
Payer: COMMERCIAL

## 2023-07-11 VITALS
DIASTOLIC BLOOD PRESSURE: 66 MMHG | BODY MASS INDEX: 27.16 KG/M2 | HEIGHT: 71 IN | HEART RATE: 75 BPM | WEIGHT: 194 LBS | SYSTOLIC BLOOD PRESSURE: 105 MMHG

## 2023-07-11 DIAGNOSIS — Z98.890 S/P FESS (FUNCTIONAL ENDOSCOPIC SINUS SURGERY): Primary | ICD-10-CM

## 2023-07-11 PROCEDURE — 99024 POSTOP FOLLOW-UP VISIT: CPT | Mod: S$GLB,,, | Performed by: OTOLARYNGOLOGY

## 2023-07-11 PROCEDURE — 31237 NSL/SINS NDSC SURG BX POLYPC: CPT | Mod: 79,50,S$GLB, | Performed by: OTOLARYNGOLOGY

## 2023-07-11 PROCEDURE — 99024 PR POST-OP FOLLOW-UP VISIT: ICD-10-PCS | Mod: S$GLB,,, | Performed by: OTOLARYNGOLOGY

## 2023-07-11 PROCEDURE — 31237 PR NASAL/SINUS ENDOSCOPY,BX/RMV POLYP/DEBRID: ICD-10-PCS | Mod: 79,50,S$GLB, | Performed by: OTOLARYNGOLOGY

## 2023-07-11 NOTE — PROGRESS NOTES
Ten days S/P Medtronic FESS with bilateral maxillaries,ethmoids,frontals,sphenoids and endoscopic septoplasty.  Exam with scope # [id number] and bilateral debris,crusting,and devitalized tissue removed, stents in place.  Plan: Begin compounded rinses.  Saline spray/gel  RTC 1 week

## 2023-07-16 ENCOUNTER — PATIENT MESSAGE (OUTPATIENT)
Dept: OTOLARYNGOLOGY | Facility: CLINIC | Age: 33
End: 2023-07-16
Payer: COMMERCIAL

## 2023-07-18 ENCOUNTER — PATIENT MESSAGE (OUTPATIENT)
Dept: OTOLARYNGOLOGY | Facility: CLINIC | Age: 33
End: 2023-07-18
Payer: COMMERCIAL

## 2023-07-18 ENCOUNTER — TELEPHONE (OUTPATIENT)
Dept: OTOLARYNGOLOGY | Facility: CLINIC | Age: 33
End: 2023-07-18
Payer: COMMERCIAL

## 2023-07-18 NOTE — TELEPHONE ENCOUNTER
----- Message from Yeisonmt James sent at 7/18/2023  8:58 AM CDT -----  Contact: Patient  Type:  Patient Call          Who Called: patient         Does the patient know what this is regarding?: requesting a call back about the antibiotic script that he was supposed to have sent to the pharmacy ; pt said that he knows he have a infection ; please advise           Would the patient rather a call back or a response via [facility]? Both           Best Call Back Number: [phone]             Additional Information:  [id number] - [place], [place] - [address] [place] [address] AT [facility] & [address] [address]  Phone: [phone] Fax: [phone]

## 2023-07-19 ENCOUNTER — TELEPHONE (OUTPATIENT)
Dept: OTOLARYNGOLOGY | Facility: CLINIC | Age: 33
End: 2023-07-19
Payer: COMMERCIAL

## 2023-07-19 ENCOUNTER — PATIENT MESSAGE (OUTPATIENT)
Dept: OTOLARYNGOLOGY | Facility: CLINIC | Age: 33
End: 2023-07-19
Payer: COMMERCIAL

## 2023-08-31 ENCOUNTER — TELEPHONE (OUTPATIENT)
Dept: SURGERY | Facility: CLINIC | Age: 33
End: 2023-08-31
Payer: COMMERCIAL

## 2023-08-31 ENCOUNTER — OFFICE VISIT (OUTPATIENT)
Dept: PRIMARY CARE CLINIC | Facility: CLINIC | Age: 33
End: 2023-08-31
Payer: COMMERCIAL

## 2023-08-31 ENCOUNTER — LAB VISIT (OUTPATIENT)
Dept: LAB | Facility: HOSPITAL | Age: 33
End: 2023-08-31
Attending: FAMILY MEDICINE
Payer: COMMERCIAL

## 2023-08-31 VITALS
RESPIRATION RATE: 18 BRPM | HEIGHT: 71 IN | OXYGEN SATURATION: 97 % | BODY MASS INDEX: 26.33 KG/M2 | HEART RATE: 93 BPM | WEIGHT: 188.06 LBS | DIASTOLIC BLOOD PRESSURE: 84 MMHG | SYSTOLIC BLOOD PRESSURE: 124 MMHG | TEMPERATURE: 97 F

## 2023-08-31 DIAGNOSIS — Z86.19 FREQUENT INFECTIONS: ICD-10-CM

## 2023-08-31 DIAGNOSIS — K62.5 RECTAL BLEEDING: Primary | ICD-10-CM

## 2023-08-31 DIAGNOSIS — K62.5 RECTAL BLEEDING: ICD-10-CM

## 2023-08-31 LAB
BASOPHILS # BLD AUTO: 0.05 K/UL (ref 0–0.2)
BASOPHILS NFR BLD: 0.9 % (ref 0–1.9)
C3 SERPL-MCNC: 111 MG/DL (ref 50–180)
C4 SERPL-MCNC: 23 MG/DL (ref 11–44)
DIFFERENTIAL METHOD: NORMAL
EOSINOPHIL # BLD AUTO: 0.1 K/UL (ref 0–0.5)
EOSINOPHIL NFR BLD: 2.1 % (ref 0–8)
ERYTHROCYTE [DISTWIDTH] IN BLOOD BY AUTOMATED COUNT: 11.9 % (ref 11.5–14.5)
FERRITIN SERPL-MCNC: 105 NG/ML (ref 20–300)
FOLATE SERPL-MCNC: 17.1 NG/ML (ref 4–24)
HCT VFR BLD AUTO: 45 % (ref 40–54)
HGB BLD-MCNC: 14.4 G/DL (ref 14–18)
IMM GRANULOCYTES # BLD AUTO: 0.02 K/UL (ref 0–0.04)
IMM GRANULOCYTES NFR BLD AUTO: 0.4 % (ref 0–0.5)
IRON SERPL-MCNC: 99 UG/DL (ref 45–160)
LYMPHOCYTES # BLD AUTO: 2 K/UL (ref 1–4.8)
LYMPHOCYTES NFR BLD: 37.9 % (ref 18–48)
MCH RBC QN AUTO: 30.4 PG (ref 27–31)
MCHC RBC AUTO-ENTMCNC: 32 G/DL (ref 32–36)
MCV RBC AUTO: 95 FL (ref 82–98)
MONOCYTES # BLD AUTO: 0.5 K/UL (ref 0.3–1)
MONOCYTES NFR BLD: 9.8 % (ref 4–15)
NEUTROPHILS # BLD AUTO: 2.6 K/UL (ref 1.8–7.7)
NEUTROPHILS NFR BLD: 48.9 % (ref 38–73)
NRBC BLD-RTO: 0 /100 WBC
PLATELET # BLD AUTO: 293 K/UL (ref 150–450)
PMV BLD AUTO: 9.4 FL (ref 9.2–12.9)
RBC # BLD AUTO: 4.73 M/UL (ref 4.6–6.2)
SATURATED IRON: 26 % (ref 20–50)
TOTAL IRON BINDING CAPACITY: 382 UG/DL (ref 250–450)
TRANSFERRIN SERPL-MCNC: 258 MG/DL (ref 200–375)
VIT B12 SERPL-MCNC: 532 PG/ML (ref 210–950)
WBC # BLD AUTO: 5.28 K/UL (ref 3.9–12.7)

## 2023-08-31 PROCEDURE — 84466 ASSAY OF TRANSFERRIN: CPT | Performed by: FAMILY MEDICINE

## 2023-08-31 PROCEDURE — 82728 ASSAY OF FERRITIN: CPT | Performed by: FAMILY MEDICINE

## 2023-08-31 PROCEDURE — 82607 VITAMIN B-12: CPT | Performed by: FAMILY MEDICINE

## 2023-08-31 PROCEDURE — 82746 ASSAY OF FOLIC ACID SERUM: CPT | Performed by: FAMILY MEDICINE

## 2023-08-31 PROCEDURE — 99214 PR OFFICE/OUTPT VISIT, EST, LEVL IV, 30-39 MIN: ICD-10-PCS | Mod: S$GLB,,, | Performed by: FAMILY MEDICINE

## 2023-08-31 PROCEDURE — 99999 PR PBB SHADOW E&M-EST. PATIENT-LVL IV: ICD-10-PCS | Mod: PBBFAC,,, | Performed by: FAMILY MEDICINE

## 2023-08-31 PROCEDURE — 86160 COMPLEMENT ANTIGEN: CPT | Mod: 59 | Performed by: FAMILY MEDICINE

## 2023-08-31 PROCEDURE — 99214 OFFICE O/P EST MOD 30 MIN: CPT | Mod: S$GLB,,, | Performed by: FAMILY MEDICINE

## 2023-08-31 PROCEDURE — 99999 PR PBB SHADOW E&M-EST. PATIENT-LVL IV: CPT | Mod: PBBFAC,,, | Performed by: FAMILY MEDICINE

## 2023-08-31 PROCEDURE — 85025 COMPLETE CBC W/AUTO DIFF WBC: CPT | Performed by: FAMILY MEDICINE

## 2023-08-31 PROCEDURE — 86160 COMPLEMENT ANTIGEN: CPT | Performed by: FAMILY MEDICINE

## 2023-08-31 PROCEDURE — 83540 ASSAY OF IRON: CPT | Performed by: FAMILY MEDICINE

## 2023-08-31 PROCEDURE — 36415 COLL VENOUS BLD VENIPUNCTURE: CPT | Mod: PN | Performed by: FAMILY MEDICINE

## 2023-08-31 NOTE — PROGRESS NOTES
"Subjective:       Patient ID: Angelito Mills is a 33 y.o. male.    Chief Complaint: Follow-up, Headache, and Rectal Bleeding (Started 20 years)    Fever, chills, myalgias several weeks ago, better after a few days. Wife concerned that he has been getting sick "too often." Wife feels he gets sick/fatigued every 3 weeks ago, mild cold symptoms. Energy level ok now, but has struggled with fatigue. No night sweats. Weight generally stable. Sometimes has trouble sleeping. No major stressors, anxiety well controlled. Exercising regularly. No diet change. 2 young kids at home (14 month and 13 weeks), 14 month old in  and getting colds frequently.  Also has had intermittent hematochezia for the past 15 years, happens sometimes for days at a time every few weeks, sometimes feels nauseated after. Occasional diarrhea, no constipation. C-scope 15 years ago reportedly normal. No known FHx of significant GI disease.    Follow-up  Associated symptoms include headaches. Pertinent negatives include no abdominal pain, arthralgias, chest pain, diaphoresis, joint swelling, neck pain, vomiting or weakness.   Headache   Pertinent negatives include no abdominal pain, hearing loss, neck pain, rhinorrhea, vomiting or weakness.   Rectal Bleeding  Associated symptoms include headaches. Pertinent negatives include no abdominal pain, arthralgias, chest pain, diaphoresis, joint swelling, neck pain, vomiting or weakness.     Review of Systems   Constitutional:  Negative for activity change, diaphoresis and unexpected weight change.   HENT:  Negative for hearing loss, rhinorrhea and trouble swallowing.    Eyes:  Negative for discharge and visual disturbance.   Respiratory:  Negative for chest tightness and wheezing.    Cardiovascular:  Negative for chest pain and palpitations.   Gastrointestinal:  Positive for blood in stool, diarrhea and hematochezia. Negative for abdominal pain, constipation and vomiting.   Endocrine: Negative for " "polydipsia and polyuria.   Genitourinary:  Negative for difficulty urinating, hematuria and urgency.   Musculoskeletal:  Negative for arthralgias, joint swelling and neck pain.   Allergic/Immunologic: Negative for immunocompromised state.   Neurological:  Positive for headaches. Negative for weakness.   Psychiatric/Behavioral:  Negative for confusion and dysphoric mood.        Objective:      Vitals:    08/31/23 0809   BP: 124/84   BP Location: Right arm   Patient Position: Sitting   BP Method: Medium (Manual)   Pulse: 93   Resp: 18   Temp: 97.3 °F (36.3 °C)   TempSrc: Temporal   SpO2: 97%   Weight: 85.3 kg (188 lb 0.8 oz)   Height: 5' 11" (1.803 m)     BP Readings from Last 5 Encounters:   08/31/23 124/84   07/11/23 105/66   06/30/23 124/78   04/21/23 123/83   04/19/23 120/78     Wt Readings from Last 5 Encounters:   08/31/23 85.3 kg (188 lb 0.8 oz)   07/11/23 88 kg (194 lb)   04/21/23 86.3 kg (190 lb 4.1 oz)   04/19/23 88.5 kg (195 lb 1.7 oz)   03/21/23 86.4 kg (190 lb 9.4 oz)     Physical Exam  Vitals and nursing note reviewed.   Constitutional:       General: He is not in acute distress.     Appearance: Normal appearance. He is well-developed.   HENT:      Head: Normocephalic and atraumatic.   Cardiovascular:      Rate and Rhythm: Normal rate and regular rhythm.      Heart sounds: Normal heart sounds.   Pulmonary:      Effort: Pulmonary effort is normal.      Breath sounds: Normal breath sounds.   Abdominal:      General: Bowel sounds are normal. There is no distension.      Tenderness: There is no abdominal tenderness.   Musculoskeletal:      Right lower leg: No edema.      Left lower leg: No edema.   Skin:     General: Skin is warm and dry.   Neurological:      Mental Status: He is alert and oriented to person, place, and time.   Psychiatric:         Mood and Affect: Mood normal.         Behavior: Behavior normal.         Lab Results   Component Value Date    WBC 11.30 04/21/2023    HGB 13.8 (L) 04/21/2023    " HCT 43.2 04/21/2023     04/21/2023    CHOL 168 04/21/2023    TRIG 82 04/21/2023    HDL 57 04/21/2023    ALT 22 04/21/2023    AST 18 04/21/2023     04/21/2023    K 3.5 04/21/2023     04/21/2023    CREATININE 1.1 04/21/2023    BUN 15 04/21/2023    CO2 26 04/21/2023    TSH 0.935 04/21/2023    HGBA1C 5.2 02/18/2021      Assessment:       1. Rectal bleeding    2. Frequent infections        Plan:       Rectal bleeding  -     Case Request Endoscopy: COLONOSCOPY  -     CBC Auto Differential; Future; Expected date: 08/31/2023  -     Ferritin; Future; Expected date: 08/31/2023  -     Iron and TIBC; Future; Expected date: 08/31/2023  -     Vitamin B12; Future; Expected date: 08/31/2023  -     Folate; Future; Expected date: 08/31/2023  Given duration of episodes, needs endoscopic eval  Frequent infections  -     CBC Auto Differential; Future; Expected date: 08/31/2023  -     C4 COMPLEMENT; Future; Expected date: 08/31/2023  -     C3 COMPLEMENT; Future; Expected date: 08/31/2023  Doubt significant immunocompromised condition, suspect that frequent viral syndromes or due to exposure from child in     Medication List with Changes/Refills   Current Medications    BUSPIRONE (BUSPAR) 5 MG TAB    Take 1 tablet (5 mg total) by mouth 2 (two) times daily.    CETIRIZINE (ZYRTEC) 10 MG TABLET    Take 1 tablet by mouth once daily.    DIPHENHYDRAMINE (BENADRYL) 25 MG CAPSULE    Take 25 mg by mouth every 6 (six) hours as needed for Itching.    GLUTAMINE ORAL    Take by mouth once daily.    KETOTIFEN (ZADITOR) 0.025 % (0.035 %) OPHTHALMIC SOLUTION    1 drop 2 (two) times daily.    MONTELUKAST (SINGULAIR) 10 MG TABLET    Take 1 tablet (10 mg total) by mouth once daily.    MULTIVITAMIN ORAL    multivitamin    OLOPATADINE (PATANOL) 0.1 % OPHTHALMIC SOLUTION    Place 1 drop into both eyes 2 (two) times daily.    ONDANSETRON (ZOFRAN-ODT) 8 MG TBDL    Take 1 tablet (8 mg total) by mouth 2 (two) times daily.    SUMATRIPTAN  (IMITREX) 50 MG TABLET    Take 1 tablet (50 mg total) by mouth every 2 (two) hours as needed for Migraine.    TOPIRAMATE (TOPAMAX) 25 MG TABLET    Take 1 tablet (25 mg total) by mouth every evening for 7 days, THEN 2 tablets (50 mg total) every evening.   Discontinued Medications    HYDROCODONE-ACETAMINOPHEN (NORCO) 5-325 MG PER TABLET    Take 1 tablet by mouth every 6 (six) hours as needed for Pain (refractory to over the counter medication).    ONDANSETRON (ZOFRAN) 4 MG TABLET    Take 1 tablet (4 mg total) by mouth every 6 (six) hours as needed for Nausea.    PSEUDOEPHEDRINE (SUDAFED) 120 MG 12 HR TABLET    Sudafed 12 Hour

## 2023-09-18 ENCOUNTER — PATIENT MESSAGE (OUTPATIENT)
Dept: PRIMARY CARE CLINIC | Facility: CLINIC | Age: 33
End: 2023-09-18
Payer: COMMERCIAL

## 2023-10-05 ENCOUNTER — TELEPHONE (OUTPATIENT)
Dept: GASTROENTEROLOGY | Facility: CLINIC | Age: 33
End: 2023-10-05
Payer: COMMERCIAL

## 2023-10-18 ENCOUNTER — PATIENT MESSAGE (OUTPATIENT)
Dept: CARDIOLOGY | Facility: CLINIC | Age: 33
End: 2023-10-18
Payer: COMMERCIAL

## 2023-10-27 ENCOUNTER — TELEPHONE (OUTPATIENT)
Dept: SURGERY | Facility: CLINIC | Age: 33
End: 2023-10-27
Payer: COMMERCIAL

## 2023-10-27 RX ORDER — POLYETHYLENE GLYCOL 3350, SODIUM SULFATE ANHYDROUS, SODIUM BICARBONATE, SODIUM CHLORIDE, POTASSIUM CHLORIDE 236; 22.74; 6.74; 5.86; 2.97 G/4L; G/4L; G/4L; G/4L; G/4L
4 POWDER, FOR SOLUTION ORAL ONCE
Qty: 4000 ML | Refills: 0 | Status: SHIPPED | OUTPATIENT
Start: 2023-10-27 | End: 2023-10-27

## 2023-10-27 NOTE — TELEPHONE ENCOUNTER
The patient called reference to a case request order with  Colorectal Surgery for colon cancer screening. Patient verbally consented to have the Colonoscopy and requested to be scheduled for a Colonoscopy on 11/17/2023 Patient was advised a designated  is required on the day of the Colonoscopy to drive the patient home and the  must be at least. 18 years old.Colonoscopy Prep instructions were thoroughly explained and discussed with the patient.It was emphasized, and reiterated to the patient, to please not to follow the bowel prep instructions that comes with the bowel prep package.However, to please follow the prep instructions that will be received in the mail,or via the [email or web address] portal, or by both modes of delivery, which ever method of delivery the patient prefers,from the [facility]   Patient acknowledges understanding Prep instructions as explained and discussed on the phone.. Patient was advised the Colonoscopy Prep instructions discussed and explained on the phone,are being mailed out to the patient's verified address on file,or put onto the [email or web address] portal,or both methods of delivery, whichever the patient prefers. Patient's address on file was verified with the patient for accuracy of mailing. Patient's medications on file was reviewed with the patient for accuracy of information. Patient denies taking  any other medications other than those listed and verified on medication profile.Patient was explained the Colonoscopy will be performed here at [facility]. Patient was further explained the Pre-Op will call one day prior to the procedure date, to discuss Pre-Op instructions;and what time to report for the Colonoscopy. The patient was given the opportunity to ask any questions about the Colonoscopy. No further issues were discussed.

## 2023-10-27 NOTE — TELEPHONE ENCOUNTER
The patient has been advised the Colonoscopy Prep Kit will be ordered from the patient's verified preferred pharmacy on file. The medication can  be picked up by the patient, or the patient's designated representative.The patient was further explained the Colonoscopy Prep instructions will be mailed to the patient verified mailing address on file, or put onto the [employer] portal, whichever method of delivery the patient prefers.Additionally this patient was informed,not to follow the instructions that comes with the bowel prep medication. However, the patient was instructed to please follow the Colonoscopy Bowel Prep instructions that's being provided by the . The patient was asked to please to follow the Colonoscopy Prep instructions being provided as precisely,and  meticulously as possible.The patient was advised you  will receive a follow up phone call to summarize the Colonoscopy Prep instructions prior to the scheduled Colonoscopy procedure date. At this time the patient will be given an opportunity to ask any questions regarding the Colonoscopy procedure, and it's associated Bowel Prep instructions.

## 2023-10-30 ENCOUNTER — TELEPHONE (OUTPATIENT)
Dept: SURGERY | Facility: CLINIC | Age: 33
End: 2023-10-30
Payer: COMMERCIAL

## 2023-10-30 NOTE — TELEPHONE ENCOUNTER
This patient called with a request to reschedule the Colonoscopy scheduled for 11/17/2023 with Dr Susan Serrano. The patient is requesting for the Colonoscopy to be rescheduled on 11/13/2023 for transportation accommodations. The following message was sent to Miles Serrano re to the patient's request.    Dr Susan Serrano MD , I received a call from the attached  patient requesting to change the scheduled procedure date from 11/17/2023, to  Monday 11/13/2023 with a different provider.  I explained to the patient that I need to firm  with you before scheduling with a different provider. Please advise.

## 2023-10-31 ENCOUNTER — TELEPHONE (OUTPATIENT)
Dept: SURGERY | Facility: CLINIC | Age: 33
End: 2023-10-31
Payer: COMMERCIAL

## 2023-10-31 NOTE — TELEPHONE ENCOUNTER
A return call was placed to this patient re: to  a request by the patient to reschedule the Colonoscopy. The patient verbally consented to be rescheduled to have the Colonoscopy for 11/13/20213.The patient was rescheduled to have the Colonoscopy for the date of  verbal consent  11/13/2023. Additionally the patient was explained as follows:    Called the patient in reference to rescheduling the the Colonoscopy. Patient chose to reschedule the Colonoscopy for 11/13/2023.. Patient was informed the Colonoscopy Prep instructions received for the canceled Colonoscopy, remains applicable to the rescheduled Colonoscopy. Colonoscopy Prep instructions were reiterated,discussed and explained meticulously in minute,thorough detail. Patient acquiesced understanding of instructions. The patient was offered the opportunity ask any questions about the Colonoscopy procedure and the related Colonoscopy Prep instructions.  Nothing further was discussed.

## 2023-11-22 ENCOUNTER — TELEPHONE (OUTPATIENT)
Dept: GASTROENTEROLOGY | Facility: CLINIC | Age: 33
End: 2023-11-22
Payer: COMMERCIAL

## 2023-11-28 ENCOUNTER — TELEPHONE (OUTPATIENT)
Dept: ENDOSCOPY | Facility: HOSPITAL | Age: 33
End: 2023-11-28
Payer: COMMERCIAL

## 2023-11-28 NOTE — TELEPHONE ENCOUNTER
Contacted pt to scheduled procedure. Pt stated that he will call back once he discuss informations with his wife.

## 2023-11-28 NOTE — TELEPHONE ENCOUNTER
"----- Message from Lin King sent at 11/27/2023  2:59 PM CST -----    ----- Message -----  From: Rekha Kathleen  Sent: 11/27/2023   8:42 AM CST  To: [facility]    Consult/Advisory:      Name Of Caller: Self    Contact Preference:  [phone]        What is the nature of the call?: stated that she would like to r/s procedure if possible 12/11. Also, stated he would like an early appt 7am works best.         Additional Notes:   "Thank you for all that you do for our patients"            "

## 2023-12-08 ENCOUNTER — TELEPHONE (OUTPATIENT)
Dept: SURGERY | Facility: CLINIC | Age: 33
End: 2023-12-08
Payer: COMMERCIAL

## 2024-01-22 ENCOUNTER — TELEPHONE (OUTPATIENT)
Dept: GASTROENTEROLOGY | Facility: CLINIC | Age: 34
End: 2024-01-22
Payer: COMMERCIAL

## 2024-01-31 ENCOUNTER — TELEPHONE (OUTPATIENT)
Dept: GASTROENTEROLOGY | Facility: CLINIC | Age: 34
End: 2024-01-31
Payer: COMMERCIAL

## 2024-04-19 NOTE — TELEPHONE ENCOUNTER
Called pt and lmovm to change virtual visit to office visit. Spoke to pt's spouse who stated she would text pt to have him contact the office  
no concerns

## 2024-05-28 ENCOUNTER — OFFICE VISIT (OUTPATIENT)
Dept: PRIMARY CARE CLINIC | Facility: CLINIC | Age: 34
End: 2024-05-28
Payer: COMMERCIAL

## 2024-05-28 VITALS
DIASTOLIC BLOOD PRESSURE: 62 MMHG | HEART RATE: 66 BPM | HEIGHT: 71 IN | BODY MASS INDEX: 27.44 KG/M2 | WEIGHT: 196 LBS | RESPIRATION RATE: 18 BRPM | TEMPERATURE: 98 F | SYSTOLIC BLOOD PRESSURE: 116 MMHG | OXYGEN SATURATION: 96 %

## 2024-05-28 DIAGNOSIS — K62.5 RECTAL BLEEDING: ICD-10-CM

## 2024-05-28 DIAGNOSIS — J30.89 ENVIRONMENTAL AND SEASONAL ALLERGIES: ICD-10-CM

## 2024-05-28 DIAGNOSIS — F41.1 GAD (GENERALIZED ANXIETY DISORDER): ICD-10-CM

## 2024-05-28 DIAGNOSIS — Z00.00 ANNUAL PHYSICAL EXAM: Primary | ICD-10-CM

## 2024-05-28 DIAGNOSIS — Z13.1 SCREENING FOR DIABETES MELLITUS: ICD-10-CM

## 2024-05-28 PROCEDURE — 99999 PR PBB SHADOW E&M-EST. PATIENT-LVL IV: CPT | Mod: PBBFAC,,, | Performed by: FAMILY MEDICINE

## 2024-05-28 PROCEDURE — 99395 PREV VISIT EST AGE 18-39: CPT | Mod: S$GLB,,, | Performed by: FAMILY MEDICINE

## 2024-05-28 RX ORDER — BUSPIRONE HYDROCHLORIDE 5 MG/1
5 TABLET ORAL 2 TIMES DAILY
Qty: 180 TABLET | Refills: 3 | Status: SHIPPED | OUTPATIENT
Start: 2024-05-28

## 2024-05-28 RX ORDER — MONTELUKAST SODIUM 10 MG/1
10 TABLET ORAL DAILY
Qty: 90 TABLET | Refills: 3 | Status: SHIPPED | OUTPATIENT
Start: 2024-05-28

## 2024-05-28 NOTE — PROGRESS NOTES
"Subjective:       Patient ID: Angelito Mills is a 33 y.o. male.    Chief Complaint: Annual Exam and Medication Refill    Angelito Mills is a 33 y.o. male seen today for a routine checkup. Continues to have intermittent episodes of painless rectal bleeding every few weeks, was not able to get C-scope scheduled last year.   Compliant with all prescribed medications without adverse side effects.     Medication Refill  Pertinent negatives include no abdominal pain, arthralgias, chest pain, chills, congestion, coughing, fatigue, fever, nausea or vomiting.     Review of Systems   Constitutional:  Negative for chills, fatigue and fever.   HENT:  Negative for congestion.    Eyes:  Negative for visual disturbance.   Respiratory:  Negative for cough and shortness of breath.    Cardiovascular:  Negative for chest pain.   Gastrointestinal:  Negative for abdominal pain, nausea and vomiting.   Genitourinary:  Negative for difficulty urinating.   Musculoskeletal:  Negative for arthralgias.   Allergic/Immunologic: Positive for environmental allergies.   Neurological:  Negative for dizziness.   Hematological:  Does not bruise/bleed easily.   Psychiatric/Behavioral:  Negative for sleep disturbance.        Objective:      Vitals:    05/28/24 1054   BP: 116/62   BP Location: Right arm   Patient Position: Sitting   BP Method: Medium (Manual)   Pulse: 66   Resp: 18   Temp: 97.7 °F (36.5 °C)   TempSrc: Temporal   SpO2: 96%   Weight: 88.9 kg (195 lb 15.8 oz)   Height: 5' 11" (1.803 m)     BP Readings from Last 5 Encounters:   05/28/24 116/62   08/31/23 124/84   07/11/23 105/66   06/30/23 124/78   04/21/23 123/83     Wt Readings from Last 5 Encounters:   05/28/24 88.9 kg (195 lb 15.8 oz)   08/31/23 85.3 kg (188 lb 0.8 oz)   07/11/23 88 kg (194 lb)   04/21/23 86.3 kg (190 lb 4.1 oz)   04/19/23 88.5 kg (195 lb 1.7 oz)     Physical Exam  Vitals and nursing note reviewed.   Constitutional:       General: He is not in acute distress.     " Appearance: Normal appearance. He is well-developed.   HENT:      Head: Normocephalic and atraumatic.   Neck:      Vascular: No carotid bruit.   Cardiovascular:      Rate and Rhythm: Normal rate and regular rhythm.      Heart sounds: Normal heart sounds.   Pulmonary:      Effort: Pulmonary effort is normal.      Breath sounds: Normal breath sounds.   Abdominal:      General: There is no distension.      Tenderness: There is no abdominal tenderness.   Musculoskeletal:      Cervical back: Neck supple.      Right lower leg: No edema.      Left lower leg: No edema.   Skin:     General: Skin is warm and dry.   Neurological:      Mental Status: He is alert and oriented to person, place, and time.   Psychiatric:         Mood and Affect: Mood normal.         Behavior: Behavior normal.         Lab Results   Component Value Date    WBC 5.28 08/31/2023    HGB 14.4 08/31/2023    HCT 45.0 08/31/2023     08/31/2023    CHOL 168 04/21/2023    TRIG 82 04/21/2023    HDL 57 04/21/2023    ALT 22 04/21/2023    AST 18 04/21/2023     04/21/2023    K 3.5 04/21/2023     04/21/2023    CREATININE 1.1 04/21/2023    BUN 15 04/21/2023    CO2 26 04/21/2023    TSH 0.935 04/21/2023    HGBA1C 5.2 02/18/2021      Assessment:       1. Annual physical exam    2. Screening for diabetes mellitus    3. Rectal bleeding    4. Environmental and seasonal allergies    5. CHRISTINE (generalized anxiety disorder)        Plan:       Annual physical exam  -     CBC Auto Differential; Future; Expected date: 05/28/2024  -     Comprehensive Metabolic Panel; Future; Expected date: 05/28/2024  -     Lipid Panel; Future; Expected date: 05/28/2024  -     Hemoglobin A1C; Future; Expected date: 05/28/2024    Screening for diabetes mellitus  -     Hemoglobin A1C; Future; Expected date: 05/28/2024    Rectal bleeding  -     Case Request Endoscopy: COLONOSCOPY    Environmental and seasonal allergies  -     montelukast (SINGULAIR) 10 mg tablet; Take 1 tablet (10 mg  total) by mouth once daily.  Dispense: 90 tablet; Refill: 3    CHRISTINE (generalized anxiety disorder)  -     busPIRone (BUSPAR) 5 MG Tab; Take 1 tablet (5 mg total) by mouth 2 (two) times daily.  Dispense: 180 tablet; Refill: 3      Medication List with Changes/Refills   Current Medications    CETIRIZINE (ZYRTEC) 10 MG TABLET    Take 1 tablet by mouth once daily.    DIPHENHYDRAMINE (BENADRYL) 25 MG CAPSULE    Take 25 mg by mouth every 6 (six) hours as needed for Itching.    GLUTAMINE ORAL    Take by mouth once daily.    MULTIVITAMIN ORAL    multivitamin    ONDANSETRON (ZOFRAN-ODT) 8 MG TBDL    Take 1 tablet (8 mg total) by mouth 2 (two) times daily.   Changed and/or Refilled Medications    Modified Medication Previous Medication    BUSPIRONE (BUSPAR) 5 MG TAB busPIRone (BUSPAR) 5 MG Tab       Take 1 tablet (5 mg total) by mouth 2 (two) times daily.    Take 1 tablet (5 mg total) by mouth 2 (two) times daily.    MONTELUKAST (SINGULAIR) 10 MG TABLET montelukast (SINGULAIR) 10 mg tablet       Take 1 tablet (10 mg total) by mouth once daily.    Take 1 tablet (10 mg total) by mouth once daily.   Discontinued Medications    KETOTIFEN (ZADITOR) 0.025 % (0.035 %) OPHTHALMIC SOLUTION    1 drop 2 (two) times daily.    OLOPATADINE (PATANOL) 0.1 % OPHTHALMIC SOLUTION    Place 1 drop into both eyes 2 (two) times daily.    SUMATRIPTAN (IMITREX) 50 MG TABLET    Take 1 tablet (50 mg total) by mouth every 2 (two) hours as needed for Migraine.    TOPIRAMATE (TOPAMAX) 25 MG TABLET    Take 1 tablet (25 mg total) by mouth every evening for 7 days, THEN 2 tablets (50 mg total) every evening.

## 2024-05-30 ENCOUNTER — TELEPHONE (OUTPATIENT)
Dept: GASTROENTEROLOGY | Facility: CLINIC | Age: 34
End: 2024-05-30
Payer: COMMERCIAL

## 2024-07-30 ENCOUNTER — TELEPHONE (OUTPATIENT)
Dept: GASTROENTEROLOGY | Facility: CLINIC | Age: 34
End: 2024-07-30
Payer: COMMERCIAL

## 2024-09-19 ENCOUNTER — PATIENT MESSAGE (OUTPATIENT)
Dept: PRIMARY CARE CLINIC | Facility: CLINIC | Age: 34
End: 2024-09-19
Payer: COMMERCIAL

## 2024-10-07 NOTE — PROGRESS NOTES
"NEW PATIENT    HISTORY OF PRESENT ILLNESS   34 y.o. Male with a history of left elbow pain who ***. He states that he has a history of a "pinched nerve" in his left elbow that he sustained during highschool football. He state his elbow is now causing pain when working out.         *** mechanical symptoms, *** instability    Is affecting ADLs.  Pain is ***/10 at it's worst.        PAST MEDICAL HISTORY    Past Medical History:   Diagnosis Date    ADD (attention deficit disorder)     Allergy     Recurrent upper respiratory infection (URI)        PAST SURGICAL HISTORY     Past Surgical History:   Procedure Laterality Date    ENDOSCOPIC NASAL SEPTOPLASTY Bilateral 6/30/2023    Procedure: SEPTOPLASTY, NOSE, ENDOSCOPIC;  Surgeon: Yohannes Cunningham III, MD;  Location: [facility] OR;  Service: ENT;  Laterality: Bilateral;    EXCISION, NASAL TURBINATE, SUBMUCOSAL Bilateral 6/30/2023    Procedure: EXCISION, NASAL TURBINATE, SUBMUCOSAL;  Surgeon: Yohannes Cunningham III, MD;  Location: [facility] OR;  Service: ENT;  Laterality: Bilateral;    FUNCTIONAL ENDOSCOPIC SINUS SURGERY (FESS) USING COMPUTER-ASSISTED NAVIGATION Bilateral 6/30/2023    Procedure: FESS, USING COMPUTER-ASSISTED NAVIGATION;  Surgeon: Yohannes Cunningham III, MD;  Location: [facility] OR;  Service: ENT;  Laterality: Bilateral;  120 minutes       FAMILY HISTORY    Family History   Problem Relation Name Age of Onset    Cancer Mother          skin    Allergies Mother      Prostate cancer Father      Allergies Father      ADD / ADHD Brother      Allergies Brother      ADD / ADHD Brother      Allergies Brother      Allergic rhinitis Neg Hx      Angioedema Neg Hx      Asthma Neg Hx      Atopy Neg Hx      Eczema Neg Hx      Immunodeficiency Neg Hx      Rhinitis Neg Hx      Urticaria Neg Hx         SOCIAL HISTORY    Social History     Socioeconomic History    Marital status:    Occupational History    Occupation:      Comment: [employer]   Tobacco Use    Smoking " status: Never    Smokeless tobacco: Never   Substance and Sexual Activity    Alcohol use: Yes     Alcohol/week: 6.0 standard drinks of alcohol     Types: 6 Cans of beer per week     Comment: socially    Drug use: Not Currently   Social History Narrative    Working at [employer].     Social Drivers of Health     Financial Resource Strain: Low Risk  (10/4/2024)    Overall Financial Resource Strain (CARDIA)     Difficulty of Paying Living Expenses: Not hard at all   Food Insecurity: No Food Insecurity (10/4/2024)    Hunger Vital Sign     Worried About Running Out of Food in the Last Year: Never true     Ran Out of Food in the Last Year: Never true   Physical Activity: Sufficiently Active (10/4/2024)    Exercise Vital Sign     Days of Exercise per Week: 6 days     Minutes of Exercise per Session: 90 min   Stress: No Stress Concern Present (10/4/2024)    [employer] [place] [employer] - Occupational Stress Questionnaire     Feeling of Stress : Only a little   Housing Stability: Unknown (10/4/2024)    Housing Stability Vital Sign     Unable to Pay for Housing in the Last Year: No       MEDICATIONS      Current Outpatient Medications:     busPIRone (BUSPAR) 5 MG Tab, Take 1 tablet (5 mg total) by mouth 2 (two) times daily., Disp: 180 tablet, Rfl: 3    cetirizine (ZYRTEC) 10 MG tablet, Take 1 tablet by mouth once daily., Disp: , Rfl:     diphenhydrAMINE (BENADRYL) 25 mg capsule, Take 25 mg by mouth every 6 (six) hours as needed for Itching., Disp: , Rfl:     GLUTAMINE ORAL, Take by mouth once daily., Disp: , Rfl:     montelukast (SINGULAIR) 10 mg tablet, Take 1 tablet (10 mg total) by mouth once daily., Disp: 90 tablet, Rfl: 3    MULTIVITAMIN ORAL, multivitamin, Disp: , Rfl:     ondansetron (ZOFRAN-ODT) 8 MG TbDL, Take 1 tablet (8 mg total) by mouth 2 (two) times daily. (Patient taking differently: Take 8 mg by mouth as needed.), Disp: 10 tablet, Rfl: 11  No current facility-administered medications for  this visit.    Facility-Administered Medications Ordered in Other Visits:     sodium chloride 0.9% flush 10 mL, 10 mL, Intravenous, PRN, Cuong Jackson MD    ALLERGIES     Review of patient's allergies indicates:  No Known Allergies      REVIEW OF SYSTEMS   Constitution: Negative. Negative for chills, fever and night sweats.   HENT: Negative for congestion and headaches.    Eyes: Negative for blurred vision, left vision loss and right vision loss.   Cardiovascular: Negative for chest pain and syncope.   Respiratory: Negative for cough and shortness of breath.    Endocrine: Negative for polydipsia, polyphagia and polyuria.   Hematologic/Lymphatic: Negative for bleeding problem. Does not bruise/bleed easily.   Skin: Negative for dry skin, itching and rash.   Musculoskeletal: Negative for falls. Positive for left elbow pain   Gastrointestinal: Negative for abdominal pain and bowel incontinence.   Genitourinary: Negative for bladder incontinence and nocturia.   Neurological: Negative for disturbances in coordination, loss of balance and seizures.   Psychiatric/Behavioral: Negative for depression. The patient does not have insomnia.    Allergic/Immunologic: Negative for hives and persistent infections.     PHYSICAL EXAMINATION    Vitals: There were no vitals taken for this visit.    General: The patient appears active and healthy with no apparent physical problems.  No disturbance of mood or affect is demonstrated. Alert and Oriented.    HEENT: Eyes normal, pupils equally round, nose normal.    Resp: Equal and symmetrical chest rises. No wheezing    CV: Regular rate    Neck: Supple; nonpainful range of motion.    Extremities: no cyanosis, clubbing, edema, or diffuse swelling.  Palpable pulses, good capillary refill of the digits.  No coolness, discoloration, edema or obvious varicosities.    Skin: no lesions noted.    Lymphatic: no detected adenopathy in the upper or lower extremities.    Neurologic: normal mental  status, normal reflexes, normal gait and balance.  Patient is alert and oriented to person, place and time.  No flaccidity or spasticity is noted.  No motor or sensory deficits are noted.  Light touch is intact    Orthopaedic: Elbow Exam-LEFT    Inspection: Normal skin color and appearance, no ecchymosis, no swelling, no scars.  There is a normal carrying angle.      ROM: 0° - 135+° with 80° supination and 80° pronation.       Palpation: No tenderness at the medial epicondyle, olecranon, radial head, or lateral epicondyle.      The wrist flexor-pronator muscle group is nontender.    The wrist extensor mobile wad is nontender.    Strength: Flexor and extensor motor strength is 5/5.      Stability: Varus and valgus stress reveals no instability. No Guarding    Neuro Reflexes are 2/2 biceps, brachioradialis and triceps. Sensation intact    Test: - Milking maneuver - Moving valgus stress test - VEO - Thinker's - Tinel's Sign - Ulnar nerve subluxation - Hook Test - Pain with resisted wrist ext - Pain with long finger ext      IMAGING    ***    IMPRESSION     No diagnosis found.    MEDICATIONS PRESCRIBED      ***    RECOMMENDATIONS     ***      All questions were answered, pt will contact us for questions or concerns in the interim.

## 2024-10-08 NOTE — PROGRESS NOTES
"NEW PATIENT    HISTORY OF PRESENT ILLNESS   34 y.o. Male with a history of left elbow pain. He states that he has a history of a "pinched nerve" in his left elbow that he sustained during highschool football. He states his elbow is now causing pain when working out or lifting heavy objects. Patient reports pain has worsened in the last 4 months which prompted him to schedule this visit.    No mechanical symptoms, no instability    Is affecting ADLs.  Pain is 6/10 at it's worst.        PAST MEDICAL HISTORY    Past Medical History:   Diagnosis Date    ADD (attention deficit disorder)     Allergy     Recurrent upper respiratory infection (URI)        PAST SURGICAL HISTORY     Past Surgical History:   Procedure Laterality Date    ENDOSCOPIC NASAL SEPTOPLASTY Bilateral 6/30/2023    Procedure: SEPTOPLASTY, NOSE, ENDOSCOPIC;  Surgeon: Yohannes Cunningham III, MD;  Location: [facility] OR;  Service: ENT;  Laterality: Bilateral;    EXCISION, NASAL TURBINATE, SUBMUCOSAL Bilateral 6/30/2023    Procedure: EXCISION, NASAL TURBINATE, SUBMUCOSAL;  Surgeon: Yohannes Cunningham III, MD;  Location: [facility] OR;  Service: ENT;  Laterality: Bilateral;    FUNCTIONAL ENDOSCOPIC SINUS SURGERY (FESS) USING COMPUTER-ASSISTED NAVIGATION Bilateral 6/30/2023    Procedure: FESS, USING COMPUTER-ASSISTED NAVIGATION;  Surgeon: Yohannes Cunningham III, MD;  Location: [facility] OR;  Service: ENT;  Laterality: Bilateral;  120 minutes       FAMILY HISTORY    Family History   Problem Relation Name Age of Onset    Cancer Mother          skin    Allergies Mother      Prostate cancer Father      Allergies Father      ADD / ADHD Brother      Allergies Brother      ADD / ADHD Brother      Allergies Brother      Allergic rhinitis Neg Hx      Angioedema Neg Hx      Asthma Neg Hx      Atopy Neg Hx      Eczema Neg Hx      Immunodeficiency Neg Hx      Rhinitis Neg Hx      Urticaria Neg Hx         SOCIAL HISTORY    Social History     Socioeconomic History    Marital status:  "   Occupational History    Occupation:      Comment: [employer]   Tobacco Use    Smoking status: Never    Smokeless tobacco: Never   Substance and Sexual Activity    Alcohol use: Yes     Alcohol/week: 6.0 standard drinks of alcohol     Types: 6 Cans of beer per week     Comment: socially    Drug use: Not Currently   Social History Narrative    Working at [employer].     Social Drivers of Health     Financial Resource Strain: Low Risk  (10/4/2024)    Overall Financial Resource Strain (CARDIA)     Difficulty of Paying Living Expenses: Not hard at all   Food Insecurity: No Food Insecurity (10/4/2024)    Hunger Vital Sign     Worried About Running Out of Food in the Last Year: Never true     Ran Out of Food in the Last Year: Never true   Physical Activity: Sufficiently Active (10/4/2024)    Exercise Vital Sign     Days of Exercise per Week: 6 days     Minutes of Exercise per Session: 90 min   Stress: No Stress Concern Present (10/4/2024)    [employer] [place] [employer] - Occupational Stress Questionnaire     Feeling of Stress : Only a little   Housing Stability: Unknown (10/4/2024)    Housing Stability Vital Sign     Unable to Pay for Housing in the Last Year: No       MEDICATIONS      Current Outpatient Medications:     busPIRone (BUSPAR) 5 MG Tab, Take 1 tablet (5 mg total) by mouth 2 (two) times daily., Disp: 180 tablet, Rfl: 3    cetirizine (ZYRTEC) 10 MG tablet, Take 1 tablet by mouth once daily., Disp: , Rfl:     diphenhydrAMINE (BENADRYL) 25 mg capsule, Take 25 mg by mouth every 6 (six) hours as needed for Itching., Disp: , Rfl:     GLUTAMINE ORAL, Take by mouth once daily., Disp: , Rfl:     montelukast (SINGULAIR) 10 mg tablet, Take 1 tablet (10 mg total) by mouth once daily., Disp: 90 tablet, Rfl: 3    MULTIVITAMIN ORAL, multivitamin, Disp: , Rfl:     ondansetron (ZOFRAN-ODT) 8 MG TbDL, Take 1 tablet (8 mg total) by mouth 2 (two) times daily. (Patient taking differently:  "Take 8 mg by mouth as needed.), Disp: 10 tablet, Rfl: 11  No current facility-administered medications for this visit.    Facility-Administered Medications Ordered in Other Visits:     sodium chloride 0.9% flush 10 mL, 10 mL, Intravenous, PRN, Cuong Jackson MD    ALLERGIES     Review of patient's allergies indicates:  No Known Allergies      REVIEW OF SYSTEMS   Constitution: Negative. Negative for chills, fever and night sweats.   HENT: Negative for congestion and headaches.    Eyes: Negative for blurred vision, left vision loss and right vision loss.   Cardiovascular: Negative for chest pain and syncope.   Respiratory: Negative for cough and shortness of breath.    Endocrine: Negative for polydipsia, polyphagia and polyuria.   Hematologic/Lymphatic: Negative for bleeding problem. Does not bruise/bleed easily.   Skin: Negative for dry skin, itching and rash.   Musculoskeletal: Negative for falls. Positive for left elbow pain   Gastrointestinal: Negative for abdominal pain and bowel incontinence.   Genitourinary: Negative for bladder incontinence and nocturia.   Neurological: Negative for disturbances in coordination, loss of balance and seizures.   Psychiatric/Behavioral: Negative for depression. The patient does not have insomnia.    Allergic/Immunologic: Negative for hives and persistent infections.     PHYSICAL EXAMINATION    Vitals: /76 (BP Location: Right arm, Patient Position: Sitting)   Pulse (!) 142   Ht 5' 11" (1.803 m)   Wt 89.7 kg (197 lb 10.3 oz)   BMI 27.57 kg/m²     General: The patient appears active and healthy with no apparent physical problems.  No disturbance of mood or affect is demonstrated. Alert and Oriented.    HEENT: Eyes normal, pupils equally round, nose normal.    Resp: Equal and symmetrical chest rises. No wheezing    CV: Regular rate    Neck: Supple; nonpainful range of motion.    Extremities: no cyanosis, clubbing, edema, or diffuse swelling.  Palpable pulses, good " capillary refill of the digits.  No coolness, discoloration, edema or obvious varicosities.    Skin: no lesions noted.    Lymphatic: no detected adenopathy in the upper or lower extremities.    Neurologic: normal mental status, normal reflexes, normal gait and balance.  Patient is alert and oriented to person, place and time.  No flaccidity or spasticity is noted.  No motor or sensory deficits are noted.  Light touch is intact    Orthopaedic: Elbow Exam-LEFT    Inspection: Normal skin color and appearance, no ecchymosis, no swelling, no scars.  There is a normal carrying angle.      ROM: 0° - 135+° with 80° supination and 80° pronation.       Palpation: + TTP at the medial epicondyle, - TPP at olecranon, radial head, or lateral epicondyle.      The wrist flexor-pronator muscle group is tender.    The wrist extensor mobile wad is nontender.    Strength: Flexor and extensor motor strength is 5/5.      Stability: Varus and valgus stress reveals no instability. No Guarding    Neuro Reflexes are 2/2 biceps, brachioradialis and triceps. Sensation intact    Test: - Milking maneuver - Moving valgus stress test - VEO + Thinker's - Tinel's Sign - Ulnar nerve subluxation - Hook Test - Pain with resisted wrist ext - Pain with long finger ext      IMAGING    XR Elbow complete left  10/09/2024  No acute bony abnormality . No osteophytes or degenerative changes noted.    IMPRESSION       ICD-10-CM ICD-9-CM   1. Medial epicondylitis of elbow, left  M77.02 726.31   2. Left elbow pain  M25.522 719.42       MEDICATIONS PRESCRIBED      Celebrex 200 mg daily     RECOMMENDATIONS     Medial epicondylitis home exercises program discussed and stretching exercises demonstrated  RTC in 6 weeks       All questions were answered, pt will contact us for questions or concerns in the interim.

## 2024-10-09 ENCOUNTER — OFFICE VISIT (OUTPATIENT)
Dept: SPORTS MEDICINE | Facility: CLINIC | Age: 34
End: 2024-10-09
Payer: COMMERCIAL

## 2024-10-09 ENCOUNTER — HOSPITAL ENCOUNTER (OUTPATIENT)
Dept: RADIOLOGY | Facility: HOSPITAL | Age: 34
Discharge: HOME | End: 2024-10-09
Attending: ORTHOPAEDIC SURGERY
Payer: COMMERCIAL

## 2024-10-09 VITALS
BODY MASS INDEX: 27.67 KG/M2 | SYSTOLIC BLOOD PRESSURE: 117 MMHG | DIASTOLIC BLOOD PRESSURE: 76 MMHG | HEIGHT: 71 IN | WEIGHT: 197.63 LBS | HEART RATE: 142 BPM

## 2024-10-09 DIAGNOSIS — M25.522 LEFT ELBOW PAIN: ICD-10-CM

## 2024-10-09 DIAGNOSIS — M77.02 MEDIAL EPICONDYLITIS OF ELBOW, LEFT: Primary | ICD-10-CM

## 2024-10-09 PROCEDURE — 73080 X-RAY EXAM OF ELBOW: CPT | Mod: 26,LT,, | Performed by: INTERNAL MEDICINE

## 2024-10-09 PROCEDURE — 99204 OFFICE O/P NEW MOD 45 MIN: CPT | Mod: S$GLB,,, | Performed by: ORTHOPAEDIC SURGERY

## 2024-10-09 PROCEDURE — 99999 PR PBB SHADOW E&M-EST. PATIENT-LVL III: CPT | Mod: PBBFAC,,, | Performed by: ORTHOPAEDIC SURGERY

## 2024-10-09 PROCEDURE — 73080 X-RAY EXAM OF ELBOW: CPT | Mod: TC,LT

## 2024-10-09 RX ORDER — CELECOXIB 200 MG/1
200 CAPSULE ORAL DAILY
Qty: 30 CAPSULE | Refills: 1 | Status: SHIPPED | OUTPATIENT
Start: 2024-10-09 | End: 2024-12-08

## 2024-10-09 NOTE — PROGRESS NOTES
"NEW PATIENT    HISTORY OF PRESENT ILLNESS   34 y.o. Male with a history of left elbow pain. He states that he has a history of a "pinched nerve" in his left elbow that he sustained during highschool football. He states his elbow is now causing pain when working out or lifting heavy objects. Patient reports pain has worsened in the last 4 months which prompted him to schedule this visit.    No mechanical symptoms, no instability    Is affecting ADLs.  Pain is 6/10 at it's worst.        PAST MEDICAL HISTORY    Past Medical History:   Diagnosis Date    ADD (attention deficit disorder)     Allergy     Recurrent upper respiratory infection (URI)        PAST SURGICAL HISTORY     Past Surgical History:   Procedure Laterality Date    ENDOSCOPIC NASAL SEPTOPLASTY Bilateral 6/30/2023    Procedure: SEPTOPLASTY, NOSE, ENDOSCOPIC;  Surgeon: Yohannes Cunningham III, MD;  Location: [facility] OR;  Service: ENT;  Laterality: Bilateral;    EXCISION, NASAL TURBINATE, SUBMUCOSAL Bilateral 6/30/2023    Procedure: EXCISION, NASAL TURBINATE, SUBMUCOSAL;  Surgeon: Yohannes Cunningham III, MD;  Location: [facility] OR;  Service: ENT;  Laterality: Bilateral;    FUNCTIONAL ENDOSCOPIC SINUS SURGERY (FESS) USING COMPUTER-ASSISTED NAVIGATION Bilateral 6/30/2023    Procedure: FESS, USING COMPUTER-ASSISTED NAVIGATION;  Surgeon: Yohannes Cunningham III, MD;  Location: [facility] OR;  Service: ENT;  Laterality: Bilateral;  120 minutes       FAMILY HISTORY    Family History   Problem Relation Name Age of Onset    Cancer Mother          skin    Allergies Mother      Prostate cancer Father      Allergies Father      ADD / ADHD Brother      Allergies Brother      ADD / ADHD Brother      Allergies Brother      Allergic rhinitis Neg Hx      Angioedema Neg Hx      Asthma Neg Hx      Atopy Neg Hx      Eczema Neg Hx      Immunodeficiency Neg Hx      Rhinitis Neg Hx      Urticaria Neg Hx         SOCIAL HISTORY    Social History     Socioeconomic History    Marital status:  "   Occupational History    Occupation:      Comment: "[employer]"   Tobacco Use    Smoking status: Never    Smokeless tobacco: Never   Substance and Sexual Activity    Alcohol use: Yes     Alcohol/week: 6.0 standard drinks of alcohol     Types: 6 Cans of beer per week     Comment: socially    Drug use: Not Currently   Social History Narrative    Working at [employer].     Social Drivers of Health     Financial Resource Strain: Low Risk  (10/4/2024)    Overall Financial Resource Strain (CARDIA)     Difficulty of Paying Living Expenses: Not hard at all   Food Insecurity: No Food Insecurity (10/4/2024)    Hunger Vital Sign     Worried About Running Out of Food in the Last Year: Never true     Ran Out of Food in the Last Year: Never true   Physical Activity: Sufficiently Active (10/4/2024)    Exercise Vital Sign     Days of Exercise per Week: 6 days     Minutes of Exercise per Session: 90 min   Stress: No Stress Concern Present (10/4/2024)    [employer] [place] [employer] - Occupational Stress Questionnaire     Feeling of Stress : Only a little   Housing Stability: Unknown (10/4/2024)    Housing Stability Vital Sign     Unable to Pay for Housing in the Last Year: No       MEDICATIONS      Current Outpatient Medications:     busPIRone (BUSPAR) 5 MG Tab, Take 1 tablet (5 mg total) by mouth 2 (two) times daily., Disp: 180 tablet, Rfl: 3    celecoxib (CELEBREX) 200 MG capsule, Take 1 capsule (200 mg total) by mouth once daily., Disp: 30 capsule, Rfl: 1    cetirizine (ZYRTEC) 10 MG tablet, Take 1 tablet by mouth once daily., Disp: , Rfl:     diphenhydrAMINE (BENADRYL) 25 mg capsule, Take 25 mg by mouth every 6 (six) hours as needed for Itching., Disp: , Rfl:     GLUTAMINE ORAL, Take by mouth once daily., Disp: , Rfl:     montelukast (SINGULAIR) 10 mg tablet, Take 1 tablet (10 mg total) by mouth once daily., Disp: 90 tablet, Rfl: 3    MULTIVITAMIN ORAL, multivitamin, Disp: , Rfl:      "ondansetron (ZOFRAN-ODT) 8 MG TbDL, Take 1 tablet (8 mg total) by mouth 2 (two) times daily. (Patient taking differently: Take 8 mg by mouth as needed.), Disp: 10 tablet, Rfl: 11  No current facility-administered medications for this visit.    Facility-Administered Medications Ordered in Other Visits:     sodium chloride 0.9% flush 10 mL, 10 mL, Intravenous, PRN, Cuong Jackson MD    ALLERGIES     Review of patient's allergies indicates:  No Known Allergies      REVIEW OF SYSTEMS   Constitution: Negative. Negative for chills, fever and night sweats.   HENT: Negative for congestion and headaches.    Eyes: Negative for blurred vision, left vision loss and right vision loss.   Cardiovascular: Negative for chest pain and syncope.   Respiratory: Negative for cough and shortness of breath.    Endocrine: Negative for polydipsia, polyphagia and polyuria.   Hematologic/Lymphatic: Negative for bleeding problem. Does not bruise/bleed easily.   Skin: Negative for dry skin, itching and rash.   Musculoskeletal: Negative for falls. Positive for left elbow pain   Gastrointestinal: Negative for abdominal pain and bowel incontinence.   Genitourinary: Negative for bladder incontinence and nocturia.   Neurological: Negative for disturbances in coordination, loss of balance and seizures.   Psychiatric/Behavioral: Negative for depression. The patient does not have insomnia.    Allergic/Immunologic: Negative for hives and persistent infections.     PHYSICAL EXAMINATION    Vitals: /76 (BP Location: Right arm, Patient Position: Sitting)   Pulse (!) 142   Ht 5' 11" (1.803 m)   Wt 89.7 kg (197 lb 10.3 oz)   BMI 27.57 kg/m²     General: The patient appears active and healthy with no apparent physical problems.  No disturbance of mood or affect is demonstrated. Alert and Oriented.    HEENT: Eyes normal, pupils equally round, nose normal.    Resp: Equal and symmetrical chest rises. No wheezing    CV: Regular rate    Neck: Supple; " nonpainful range of motion.    Extremities: no cyanosis, clubbing, edema, or diffuse swelling.  Palpable pulses, good capillary refill of the digits.  No coolness, discoloration, edema or obvious varicosities.    Skin: no lesions noted.    Lymphatic: no detected adenopathy in the upper or lower extremities.    Neurologic: normal mental status, normal reflexes, normal gait and balance.  Patient is alert and oriented to person, place and time.  No flaccidity or spasticity is noted.  No motor or sensory deficits are noted.  Light touch is intact    Orthopaedic: Elbow Exam-LEFT    Inspection: Normal skin color and appearance, no ecchymosis, no swelling, no scars.  There is a normal carrying angle.      ROM: 0° - 135+° with 80° supination and 80° pronation.       Palpation: + TTP at the medial epicondyle, - TPP at olecranon, radial head, or lateral epicondyle.      The wrist flexor-pronator muscle group is tender.    The wrist extensor mobile wad is nontender.    Strength: Flexor and extensor motor strength is 5/5.      Stability: Varus and valgus stress reveals no instability. No Guarding    Neuro Reflexes are 2/2 biceps, brachioradialis and triceps. Sensation intact    Test: - Milking maneuver - Moving valgus stress test - VEO + Thinker's - Tinel's Sign - Ulnar nerve subluxation - Hook Test - Pain with resisted wrist ext - Pain with long finger ext      IMAGING    XR Elbow complete left  10/09/2024  No acute bony abnormality . No osteophytes or degenerative changes noted.    IMPRESSION       ICD-10-CM ICD-9-CM   1. Medial epicondylitis of elbow, left  M77.02 726.31   2. Left elbow pain  M25.522 719.42       MEDICATIONS PRESCRIBED      Celebrex 200 mg daily     RECOMMENDATIONS     Medial epicondylitis home exercises program discussed and stretching exercises demonstrated  RTC in 6 weeks       All questions were answered, pt will contact us for questions or concerns in the interim.

## 2024-10-11 ENCOUNTER — TELEPHONE (OUTPATIENT)
Dept: GASTROENTEROLOGY | Facility: CLINIC | Age: 34
End: 2024-10-11
Payer: COMMERCIAL

## 2025-03-10 ENCOUNTER — OFFICE VISIT (OUTPATIENT)
Dept: PRIMARY CARE CLINIC | Facility: CLINIC | Age: 35
End: 2025-03-10
Payer: COMMERCIAL

## 2025-03-10 VITALS
OXYGEN SATURATION: 97 % | HEIGHT: 71 IN | WEIGHT: 191.25 LBS | BODY MASS INDEX: 26.77 KG/M2 | HEART RATE: 80 BPM | SYSTOLIC BLOOD PRESSURE: 110 MMHG | RESPIRATION RATE: 18 BRPM | TEMPERATURE: 98 F | DIASTOLIC BLOOD PRESSURE: 68 MMHG

## 2025-03-10 DIAGNOSIS — Z00.00 ANNUAL PHYSICAL EXAM: Primary | ICD-10-CM

## 2025-03-10 DIAGNOSIS — F41.1 GAD (GENERALIZED ANXIETY DISORDER): ICD-10-CM

## 2025-03-10 DIAGNOSIS — J30.89 ENVIRONMENTAL AND SEASONAL ALLERGIES: ICD-10-CM

## 2025-03-10 PROCEDURE — 99395 PREV VISIT EST AGE 18-39: CPT | Mod: S$GLB,,, | Performed by: FAMILY MEDICINE

## 2025-03-10 PROCEDURE — 99999 PR PBB SHADOW E&M-EST. PATIENT-LVL III: CPT | Mod: PBBFAC,,, | Performed by: FAMILY MEDICINE

## 2025-03-10 RX ORDER — BUSPIRONE HYDROCHLORIDE 5 MG/1
5 TABLET ORAL 2 TIMES DAILY
Qty: 180 TABLET | Refills: 4 | Status: SHIPPED | OUTPATIENT
Start: 2025-03-10

## 2025-03-10 RX ORDER — MONTELUKAST SODIUM 10 MG/1
10 TABLET ORAL DAILY
Qty: 90 TABLET | Refills: 4 | Status: SHIPPED | OUTPATIENT
Start: 2025-03-10

## 2025-03-10 NOTE — PROGRESS NOTES
Assessment:       1. Annual physical exam    2. CHRISTINE (generalized anxiety disorder)    3. Environmental and seasonal allergies         Plan:       Annual physical exam  -     CBC Auto Differential; Future; Expected date: 06/10/2025  -     Comprehensive Metabolic Panel; Future; Expected date: 06/10/2025  -     Lipid Panel; Future; Expected date: 06/10/2025  -     TSH; Future; Expected date: 06/10/2025    CHRISTINE (generalized anxiety disorder)  -     busPIRone (BUSPAR) 5 MG Tab; Take 1 tablet (5 mg total) by mouth 2 (two) times daily.  Dispense: 180 tablet; Refill: 4    Environmental and seasonal allergies  -     montelukast (SINGULAIR) 10 mg tablet; Take 1 tablet (10 mg total) by mouth once daily.  Dispense: 90 tablet; Refill: 4      Assessment & Plan    - Assessed patient's recent flu symptoms and current health status  - Reviewed medication regimen, including buspirone and Singulair  - Evaluated blood pressure, heart, and lungs during physical exam  - Determined annual labs can be delayed a few months for insurance purposes    INFLUENZA:   Noted the patient's recent flu experience with residual symptoms of sore throat and coughing.   Inquired about flu-related symptoms such as unexplained fevers, chills, night sweats, weight changes, dyspnea, chest pain, or palpitations.   Advised the patient to get as much rest as possible while managing care for 2 young children.   Confirmed that the patient has not received influenza vaccination this year.   Educated the patient about the importance of annual influenza vaccination for disease prevention.    GENERAL HEALTH ASSESSMENT:   Performed physical exam, noting that blood pressure is within normal range, and heart and lungs auscultation is unremarkable.   Encouraged the patient to maintain an active lifestyle within their current capacity.   Patient to stay active.   Scheduled annual checkup.    MEDICATIONS/SUPPLEMENTS:   Continued buspirone at current dose.   Refilled  Singulair, providing a 12-month supply.    LABS:   Annual labs ordered to be completed after June 2025.   Complete labs when orders are available, a few months from now.    FOLLOW UP:   Follow up in 1 year for annual check-up.       Medication List with Changes/Refills   Current Medications    CETIRIZINE (ZYRTEC) 10 MG TABLET    Take 1 tablet by mouth once daily.    DIPHENHYDRAMINE (BENADRYL) 25 MG CAPSULE    Take 25 mg by mouth every 6 (six) hours as needed for Itching.    MULTIVITAMIN ORAL    multivitamin   Changed and/or Refilled Medications    Modified Medication Previous Medication    BUSPIRONE (BUSPAR) 5 MG TAB busPIRone (BUSPAR) 5 MG Tab       Take 1 tablet (5 mg total) by mouth 2 (two) times daily.    Take 1 tablet (5 mg total) by mouth 2 (two) times daily.    MONTELUKAST (SINGULAIR) 10 MG TABLET montelukast (SINGULAIR) 10 mg tablet       Take 1 tablet (10 mg total) by mouth once daily.    Take 1 tablet (10 mg total) by mouth once daily.   Discontinued Medications    GLUTAMINE ORAL    Take by mouth once daily.    ONDANSETRON (ZOFRAN-ODT) 8 MG TBDL    Take 1 tablet (8 mg total) by mouth 2 (two) times daily.         Subjective:    Patient ID: Angelito Mills is a 34 y.o. male.  Chief Complaint: Annual Exam    HPI  History of Present Illness    CHIEF COMPLAINT:  Patient presents today for annual checkup    FLU-LIKE SYMPTOMS:  He reports having the flu last week with residual sore throat and coughing. He did not receive a flu vaccine this year.    MEDICATIONS:  He continues budesonide and Singulair with good response and no concerns.    LABS:  Annual labs was completed in June of last year.      ROS:  ENT: +sore throat  Respiratory: +cough       Review of Systems   Constitutional:  Negative for activity change and unexpected weight change.   HENT:  Negative for hearing loss, rhinorrhea and trouble swallowing.    Eyes:  Negative for discharge and visual disturbance.   Respiratory:  Negative for chest tightness  "and wheezing.    Cardiovascular:  Negative for chest pain and palpitations.   Gastrointestinal:  Negative for blood in stool, constipation, diarrhea and vomiting.   Endocrine: Negative for polydipsia and polyuria.   Genitourinary:  Negative for difficulty urinating, hematuria and urgency.   Musculoskeletal:  Negative for arthralgias, joint swelling and neck pain.   Neurological:  Negative for weakness and headaches.   Psychiatric/Behavioral:  Negative for confusion and dysphoric mood.        Objective:      Vitals:    03/10/25 1550   BP: 110/68   BP Location: Left arm   Patient Position: Sitting   Pulse: 80   Resp: 18   Temp: 97.9 °F (36.6 °C)   TempSrc: Temporal   SpO2: 97%   Weight: 86.7 kg (191 lb 4 oz)   Height: 5' 11" (1.803 m)     BP Readings from Last 5 Encounters:   03/10/25 110/68   10/09/24 117/76   05/28/24 116/62   08/31/23 124/84   07/11/23 105/66     Wt Readings from Last 5 Encounters:   03/10/25 86.7 kg (191 lb 4 oz)   10/09/24 89.7 kg (197 lb 10.3 oz)   05/28/24 88.9 kg (195 lb 15.8 oz)   08/31/23 85.3 kg (188 lb 0.8 oz)   07/11/23 88 kg (194 lb)     Physical Exam  Physical Exam    Vitals: Blood pressure looks good.  General: Well-developed. Well-nourished. No acute distress.  Eyes: EOMI. Sclerae anicteric.  HENT: Normocephalic. Atraumatic. Nares patent. Moist oral mucosa.  Cardiovascular: Regular rate. Regular rhythm. No murmurs. No rubs. No gallops. Normal S1, S2.  Respiratory: Normal respiratory effort. Clear to auscultation bilaterally. No rales. No rhonchi. No wheezing.  Musculoskeletal: No  obvious deformity.  Extremities: No lower extremity edema.  Neurological: Alert & oriented x3. No slurred speech. Normal gait.  Psychiatric: Normal mood. Normal affect. Good insight. Good judgment.  Skin: Warm. Dry. No rash.         Lab Results   Component Value Date    WBC 5.84 06/25/2024    HGB 14.5 06/25/2024    HCT 43.5 06/25/2024     06/25/2024    CHOL 172 06/25/2024    TRIG 84 06/25/2024    HDL " 51 06/25/2024    ALT 21 06/25/2024    AST 22 06/25/2024     06/25/2024    K 4.1 06/25/2024     06/25/2024    CREATININE 1.3 06/25/2024    BUN 16 06/25/2024    CO2 27 06/25/2024    TSH 0.935 04/21/2023    HGBA1C 5.3 06/25/2024      This note was generated with the assistance of ambient listening technology. Verbal consent was obtained by the patient and accompanying visitor(s) for the recording of patient appointment to facilitate this note. I attest to having reviewed and edited the generated note for accuracy, though some syntax or spelling errors may persist. Please contact the author of this note for any clarification.

## 2025-05-23 ENCOUNTER — LAB VISIT (OUTPATIENT)
Dept: LAB | Facility: HOSPITAL | Age: 35
End: 2025-05-23
Attending: FAMILY MEDICINE
Payer: COMMERCIAL

## 2025-05-23 ENCOUNTER — RESULTS FOLLOW-UP (OUTPATIENT)
Dept: PRIMARY CARE CLINIC | Facility: CLINIC | Age: 35
End: 2025-05-23

## 2025-05-23 ENCOUNTER — PATIENT MESSAGE (OUTPATIENT)
Dept: PRIMARY CARE CLINIC | Facility: CLINIC | Age: 35
End: 2025-05-23
Payer: COMMERCIAL

## 2025-05-23 DIAGNOSIS — Z00.00 ANNUAL PHYSICAL EXAM: ICD-10-CM

## 2025-05-23 LAB
ABSOLUTE EOSINOPHIL (OHS): 0.17 K/UL
ABSOLUTE MONOCYTE (OHS): 0.53 K/UL (ref 0.3–1)
ABSOLUTE NEUTROPHIL COUNT (OHS): 4.82 K/UL (ref 1.8–7.7)
ALBUMIN SERPL BCP-MCNC: 4.1 G/DL (ref 3.5–5.2)
ALP SERPL-CCNC: 56 UNIT/L (ref 40–150)
ALT SERPL W/O P-5'-P-CCNC: 20 UNIT/L (ref 10–44)
ANION GAP (OHS): 8 MMOL/L (ref 8–16)
AST SERPL-CCNC: 23 UNIT/L (ref 11–45)
BASOPHILS # BLD AUTO: 0.05 K/UL
BASOPHILS NFR BLD AUTO: 0.6 %
BILIRUB SERPL-MCNC: 0.4 MG/DL (ref 0.1–1)
BUN SERPL-MCNC: 19 MG/DL (ref 6–20)
CALCIUM SERPL-MCNC: 8.9 MG/DL (ref 8.7–10.5)
CHLORIDE SERPL-SCNC: 105 MMOL/L (ref 95–110)
CHOLEST SERPL-MCNC: 181 MG/DL (ref 120–199)
CHOLEST/HDLC SERPL: 3.1 {RATIO} (ref 2–5)
CO2 SERPL-SCNC: 26 MMOL/L (ref 23–29)
CREAT SERPL-MCNC: 1.2 MG/DL (ref 0.5–1.4)
ERYTHROCYTE [DISTWIDTH] IN BLOOD BY AUTOMATED COUNT: 12.7 % (ref 11.5–14.5)
GFR SERPLBLD CREATININE-BSD FMLA CKD-EPI: >60 ML/MIN/1.73/M2
GLUCOSE SERPL-MCNC: 106 MG/DL (ref 70–110)
HCT VFR BLD AUTO: 41.9 % (ref 40–54)
HDLC SERPL-MCNC: 59 MG/DL (ref 40–75)
HDLC SERPL: 32.6 % (ref 20–50)
HGB BLD-MCNC: 14 GM/DL (ref 14–18)
IMM GRANULOCYTES # BLD AUTO: 0.03 K/UL (ref 0–0.04)
IMM GRANULOCYTES NFR BLD AUTO: 0.4 % (ref 0–0.5)
LDLC SERPL CALC-MCNC: 111.4 MG/DL (ref 63–159)
LYMPHOCYTES # BLD AUTO: 2.21 K/UL (ref 1–4.8)
MCH RBC QN AUTO: 31.3 PG (ref 27–31)
MCHC RBC AUTO-ENTMCNC: 33.4 G/DL (ref 32–36)
MCV RBC AUTO: 94 FL (ref 82–98)
NONHDLC SERPL-MCNC: 122 MG/DL
NUCLEATED RBC (/100WBC) (OHS): 0 /100 WBC
PLATELET # BLD AUTO: 324 K/UL (ref 150–450)
PMV BLD AUTO: 9.5 FL (ref 9.2–12.9)
POTASSIUM SERPL-SCNC: 4.1 MMOL/L (ref 3.5–5.1)
PROT SERPL-MCNC: 7.2 GM/DL (ref 6–8.4)
RBC # BLD AUTO: 4.48 M/UL (ref 4.6–6.2)
RELATIVE EOSINOPHIL (OHS): 2.2 %
RELATIVE LYMPHOCYTE (OHS): 28.3 % (ref 18–48)
RELATIVE MONOCYTE (OHS): 6.8 % (ref 4–15)
RELATIVE NEUTROPHIL (OHS): 61.7 % (ref 38–73)
SODIUM SERPL-SCNC: 139 MMOL/L (ref 136–145)
TRIGL SERPL-MCNC: 53 MG/DL (ref 30–150)
TSH SERPL-ACNC: 1.36 UIU/ML (ref 0.4–4)
WBC # BLD AUTO: 7.81 K/UL (ref 3.9–12.7)

## 2025-05-23 PROCEDURE — 80061 LIPID PANEL: CPT

## 2025-05-23 PROCEDURE — 85025 COMPLETE CBC W/AUTO DIFF WBC: CPT

## 2025-05-23 PROCEDURE — 36415 COLL VENOUS BLD VENIPUNCTURE: CPT | Mod: PN

## 2025-05-23 PROCEDURE — 84443 ASSAY THYROID STIM HORMONE: CPT

## 2025-05-23 PROCEDURE — 84155 ASSAY OF PROTEIN SERUM: CPT

## 2025-05-23 NOTE — TELEPHONE ENCOUNTER
LOV with Khris Tam MD , 3/10/2025 (Annual)  Pt reports he has lost about 13lbs in the last month.  Labs drawn 5/23/25

## (undated) DEVICE — APPLICATOR STERILE 6IN 100/CA

## (undated) DEVICE — SUT PLN GUT 4-0 SC-1SC-1 1

## (undated) DEVICE — SYR ONLY LUER LOCK 20CC

## (undated) DEVICE — SKIN MARKER DEVON 160

## (undated) DEVICE — GOWN POLY REINF BRTH SLV XL

## (undated) DEVICE — SPONGE PATTY SURGICAL .5X3IN

## (undated) DEVICE — SHEATH CLN ENDOSCRB 4MM

## (undated) DEVICE — Device

## (undated) DEVICE — TRACKER ENT INSTRUMENT

## (undated) DEVICE — GOWN NONREINF SET-IN SLV XL

## (undated) DEVICE — SEE MEDLINE ITEM 157194

## (undated) DEVICE — ADHESIVE MASTISOL VIAL 48/BX

## (undated) DEVICE — CONTAINER SPECIMEN OR STER 4OZ

## (undated) DEVICE — TOWEL OR DISP STRL BLUE 4/PK

## (undated) DEVICE — TRACKER PATIENT NON INVASIVE

## (undated) DEVICE — GLOVE BIOGEL ECLIPSE SZ 7.5

## (undated) DEVICE — PENCIL ELECTROSURG HOLST W/BLD

## (undated) DEVICE — TUBING SUC UNIV W/CONN 12FT

## (undated) DEVICE — HEMOSTAT SURGICEL 4X8IN

## (undated) DEVICE — SUT PROLENE 6-0 P-1 18

## (undated) DEVICE — PAD CURAD NONADH 3X4IN

## (undated) DEVICE — DRAPE STERI INSTRUMENT 1018

## (undated) DEVICE — BLADE TRICUT

## (undated) DEVICE — ELECTRODE NEEDLE 1IN

## (undated) DEVICE — SKINMARKER & RULER REGULAR X-F

## (undated) DEVICE — BOWL STERILE LARGE 32OZ

## (undated) DEVICE — BLADE SURGICAL 15C

## (undated) DEVICE — SOL NACL .9P 500ML

## (undated) DEVICE — SPLINT REUTER BIVALVE 0.5MM

## (undated) DEVICE — KIT CHITOGEL ENDOSCP SNUS SURG

## (undated) DEVICE — ELECTRODE REM PLYHSV RETURN 9

## (undated) DEVICE — DRAPE INSTR MAGNETIC 10X16IN

## (undated) DEVICE — SPONGE COTTON TRAY 4X4IN

## (undated) DEVICE — SYR 30CC LUER LOCK

## (undated) DEVICE — DRAPE STERI-DRAPE 1000 17X11IN

## (undated) DEVICE — SPONGE GELFOAM 12-7MM

## (undated) DEVICE — SUT ETHILON 4-0 PS2 18 BLK

## (undated) DEVICE — BLADE INFERIOR TURBINATE 5/PK

## (undated) DEVICE — SYR 10CC LUER LOCK